# Patient Record
Sex: MALE | Race: WHITE | NOT HISPANIC OR LATINO
[De-identification: names, ages, dates, MRNs, and addresses within clinical notes are randomized per-mention and may not be internally consistent; named-entity substitution may affect disease eponyms.]

---

## 2017-01-19 ENCOUNTER — APPOINTMENT (OUTPATIENT)
Dept: INTERNAL MEDICINE | Facility: CLINIC | Age: 72
End: 2017-01-19

## 2017-01-19 VITALS
SYSTOLIC BLOOD PRESSURE: 171 MMHG | WEIGHT: 196 LBS | DIASTOLIC BLOOD PRESSURE: 80 MMHG | HEIGHT: 67 IN | HEART RATE: 76 BPM | BODY MASS INDEX: 30.76 KG/M2

## 2017-01-19 DIAGNOSIS — Z86.59 PERSONAL HISTORY OF OTHER MENTAL AND BEHAVIORAL DISORDERS: ICD-10-CM

## 2017-01-19 DIAGNOSIS — I10 ESSENTIAL (PRIMARY) HYPERTENSION: ICD-10-CM

## 2017-01-19 DIAGNOSIS — Z86.69 PERSONAL HISTORY OF OTHER DISEASES OF THE NERVOUS SYSTEM AND SENSE ORGANS: ICD-10-CM

## 2017-01-19 RX ORDER — ENALAPRIL MALEATE 20 MG/1
20 TABLET ORAL DAILY
Qty: 90 | Refills: 3 | Status: ACTIVE | COMMUNITY
Start: 2017-01-19 | End: 1900-01-01

## 2017-01-19 RX ORDER — ENALAPRIL MALEATE 10 MG/1
10 TABLET ORAL
Refills: 0 | Status: DISCONTINUED | COMMUNITY
End: 2017-01-19

## 2017-01-20 LAB
ALBUMIN SERPL-MCNC: 4.2 G/DL
ALBUMIN/GLOB SERPL: 1.83
ALP SERPL-CCNC: 51 IU/L
ALT SERPL-CCNC: 23 IU/L
ANION GAP SERPL CALC-SCNC: 8 MEQ/L
AST SERPL-CCNC: 24 IU/L
BASOPHILS # BLD: 0.04 TH/MM3
BASOPHILS NFR BLD: 0.8 %
BILIRUB SERPL-MCNC: 0.8 MG/DL
BUN SERPL-MCNC: 17 MG/DL
BUN/CREAT SERPL: 20 %
CALCIUM SERPL-MCNC: 9.7 MG/DL
CHLORIDE SERPL-SCNC: 97 MEQ/L
CO2 SERPL-SCNC: 28 MEQ/L
CREAT SERPL-MCNC: 0.85 MG/DL
EOSINOPHIL # BLD: 0.07 TH/MM3
EOSINOPHIL NFR BLD: 1.4 %
ERYTHROCYTE [DISTWIDTH] IN BLOOD BY AUTOMATED COUNT: 14.1 %
ESTIMATED AVERGAGE GLUCOSE (NORTH): 108 MG/DL
GFR SERPL CREATININE-BSD FRML MDRD: 89
GLUCOSE SERPL-MCNC: 96 MG/DL
GRANULOCYTES # BLD: 3.53 TH/MM3
GRANULOCYTES NFR BLD: 72.2 %
HBA1C MFR BLD: 5.4 %
HCT VFR BLD AUTO: 44.9 %
HGB BLD-MCNC: 14.7 G/DL
IMM GRANULOCYTES # BLD: 0.01 TH/MM3
IMM GRANULOCYTES NFR BLD: 0.2 %
LYMPHOCYTES # BLD: 0.88 TH/MM3
LYMPHOCYTES NFR BLD: 18 %
MCH RBC QN AUTO: 29 PG
MCHC RBC AUTO-ENTMCNC: 32.7 G/DL
MCV RBC AUTO: 88.6 FL
MONOCYTES # BLD: 0.36 TH/MM3
MONOCYTES NFR BLD: 7.4 %
PLATELET # BLD: 117 TH/MM3
POTASSIUM SERPL-SCNC: 4.3 MMOL/L
PROT SERPL-MCNC: 6.5 G/DL
RBC # BLD AUTO: 5.07 MIL/MM3
SODIUM SERPL-SCNC: 133 MEQ/L
WBC # BLD: 4.89 TH/MM3

## 2017-01-23 LAB
GAMMA INTERFERON BACKGROUND BLD IA-ACNC: 0.04 IU/ML
M TB IFN-G BLD-IMP: NEGATIVE
M TB IFN-G CD4+ T-CELLS BLD-ACNC: 0 IU/ML
MITOGEN IGNF BLD-ACNC: 0.6 IU/ML
TSH SERPL DL<=0.005 MIU/L-ACNC: 3.69 UIU/ML

## 2017-06-03 ENCOUNTER — INPATIENT (INPATIENT)
Facility: HOSPITAL | Age: 72
LOS: 2 days | Discharge: ANOTHER HOSPITAL | End: 2017-06-06
Attending: HOSPITALIST

## 2017-06-03 DIAGNOSIS — E87.1 HYPO-OSMOLALITY AND HYPONATREMIA: ICD-10-CM

## 2017-06-03 DIAGNOSIS — G40.909 EPILEPSY, UNSPECIFIED, NOT INTRACTABLE, WITHOUT STATUS EPILEPTICUS: ICD-10-CM

## 2017-06-28 DIAGNOSIS — E87.1 HYPO-OSMOLALITY AND HYPONATREMIA: ICD-10-CM

## 2017-06-28 DIAGNOSIS — G40.909 EPILEPSY, UNSPECIFIED, NOT INTRACTABLE, WITHOUT STATUS EPILEPTICUS: ICD-10-CM

## 2017-06-28 DIAGNOSIS — T42.1X5A ADVERSE EFFECT OF IMINOSTILBENES, INITIAL ENCOUNTER: ICD-10-CM

## 2017-06-28 DIAGNOSIS — R63.1 POLYDIPSIA: ICD-10-CM

## 2017-06-28 DIAGNOSIS — E22.2 SYNDROME OF INAPPROPRIATE SECRETION OF ANTIDIURETIC HORMONE: ICD-10-CM

## 2017-06-28 DIAGNOSIS — F79 UNSPECIFIED INTELLECTUAL DISABILITIES: ICD-10-CM

## 2017-06-28 DIAGNOSIS — F25.9 SCHIZOAFFECTIVE DISORDER, UNSPECIFIED: ICD-10-CM

## 2017-06-28 DIAGNOSIS — F39 UNSPECIFIED MOOD [AFFECTIVE] DISORDER: ICD-10-CM

## 2017-06-28 DIAGNOSIS — I10 ESSENTIAL (PRIMARY) HYPERTENSION: ICD-10-CM

## 2017-06-28 DIAGNOSIS — N39.0 URINARY TRACT INFECTION, SITE NOT SPECIFIED: ICD-10-CM

## 2017-07-05 ENCOUNTER — OUTPATIENT (OUTPATIENT)
Dept: OUTPATIENT SERVICES | Facility: HOSPITAL | Age: 72
LOS: 1 days | Discharge: HOME | End: 2017-07-05

## 2017-07-05 DIAGNOSIS — G40.909 EPILEPSY, UNSPECIFIED, NOT INTRACTABLE, WITHOUT STATUS EPILEPTICUS: ICD-10-CM

## 2017-07-05 DIAGNOSIS — E87.1 HYPO-OSMOLALITY AND HYPONATREMIA: ICD-10-CM

## 2017-07-25 ENCOUNTER — APPOINTMENT (OUTPATIENT)
Dept: NEUROLOGY | Facility: CLINIC | Age: 72
End: 2017-07-25

## 2017-07-30 ENCOUNTER — EMERGENCY (EMERGENCY)
Facility: HOSPITAL | Age: 72
LOS: 0 days | Discharge: HOME | End: 2017-07-30
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
## 2017-07-30 DIAGNOSIS — I10 ESSENTIAL (PRIMARY) HYPERTENSION: ICD-10-CM

## 2017-07-30 DIAGNOSIS — G40.909 EPILEPSY, UNSPECIFIED, NOT INTRACTABLE, WITHOUT STATUS EPILEPTICUS: ICD-10-CM

## 2017-07-30 DIAGNOSIS — E87.1 HYPO-OSMOLALITY AND HYPONATREMIA: ICD-10-CM

## 2017-07-30 DIAGNOSIS — S89.91XA UNSPECIFIED INJURY OF RIGHT LOWER LEG, INITIAL ENCOUNTER: ICD-10-CM

## 2017-07-30 DIAGNOSIS — F25.9 SCHIZOAFFECTIVE DISORDER, UNSPECIFIED: ICD-10-CM
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
## 2017-07-30 DIAGNOSIS — S80.01XA CONTUSION OF RIGHT KNEE, INITIAL ENCOUNTER: ICD-10-CM
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
## 2017-07-30 DIAGNOSIS — S80.02XA CONTUSION OF LEFT KNEE, INITIAL ENCOUNTER: ICD-10-CM

## 2017-07-30 DIAGNOSIS — R56.9 UNSPECIFIED CONVULSIONS: ICD-10-CM

## 2017-07-30 DIAGNOSIS — Z86.19 PERSONAL HISTORY OF OTHER INFECTIOUS AND PARASITIC DISEASES: ICD-10-CM

## 2018-08-06 ENCOUNTER — EMERGENCY (EMERGENCY)
Facility: HOSPITAL | Age: 73
LOS: 0 days | Discharge: HOME | End: 2018-08-06
Admitting: PHYSICIAN ASSISTANT

## 2018-08-06 VITALS
SYSTOLIC BLOOD PRESSURE: 142 MMHG | OXYGEN SATURATION: 98 % | HEART RATE: 83 BPM | RESPIRATION RATE: 18 BRPM | TEMPERATURE: 98 F | DIASTOLIC BLOOD PRESSURE: 63 MMHG

## 2018-08-06 DIAGNOSIS — M25.579 PAIN IN UNSPECIFIED ANKLE AND JOINTS OF UNSPECIFIED FOOT: ICD-10-CM

## 2018-08-06 DIAGNOSIS — M79.672 PAIN IN LEFT FOOT: ICD-10-CM

## 2018-08-06 RX ORDER — FLUOXETINE HCL 10 MG
1 CAPSULE ORAL
Qty: 0 | Refills: 0 | COMMUNITY

## 2018-08-06 RX ORDER — LEVOTHYROXINE SODIUM 125 MCG
1 TABLET ORAL
Qty: 0 | Refills: 0 | COMMUNITY

## 2018-08-06 RX ORDER — DOCUSATE SODIUM 100 MG
1 CAPSULE ORAL
Qty: 0 | Refills: 0 | COMMUNITY

## 2018-08-06 RX ORDER — LITHIUM CARBONATE 300 MG/1
150 TABLET, EXTENDED RELEASE ORAL
Qty: 0 | Refills: 0 | COMMUNITY

## 2018-08-06 RX ORDER — CHOLECALCIFEROL (VITAMIN D3) 125 MCG
1 CAPSULE ORAL
Qty: 0 | Refills: 0 | COMMUNITY

## 2018-08-06 RX ORDER — BENZTROPINE MESYLATE 1 MG
0 TABLET ORAL
Qty: 0 | Refills: 0 | COMMUNITY

## 2018-08-06 RX ORDER — CLOZAPINE 150 MG/1
0 TABLET, ORALLY DISINTEGRATING ORAL
Qty: 0 | Refills: 0 | COMMUNITY

## 2018-08-06 NOTE — ED PROVIDER NOTE - OBJECTIVE STATEMENT
72 year old male with pmhx noted, presents with left foot pain x 1 day. Pt denies injury ot trauma, swelling, or numbness.

## 2018-08-06 NOTE — ED PROVIDER NOTE - PHYSICAL EXAMINATION
CONST: Well appearing in NAD  MS: + tenderness to dorsum of left foot, pulses 2 +, Normal ROM in all extremities.   SKIN: Warm, dry, no acute rashes. Good turgor  NEURO: Strength 5/5 with no sensory deficits. Steady gait

## 2018-08-06 NOTE — CONSULT NOTE ADULT - SUBJECTIVE AND OBJECTIVE BOX
PODIATRY CONSULT   NURY MCMAHAN is a 72y old  Male who presents with a chief complaint of Left foot pain  HPI:  72y old  Male with PMH of HTN, mental Retardation, Seizure , Hypothyroid presents with left ankle pain. Pt is not able to provide sufficient HPI. Pt accompanied by aid. pt states inconsistent left ankle pain. patient is able to ambulate. Pt denies any trauma to the area. Pt denies falls or other injuries to the foot. Pt denies n/v/c/d/f/sob.       Mental retardation, idiopathic mild  Hypertension  Seizure disorder  Hypertension  Bipolar 1 disorder  Hypothyroid      PMH: Mental retardation, idiopathic mild  Hypertension  Seizure disorder  Hypertension  Bipolar 1 disorder  Hypothyroid    PSH: No significant past surgical history    Medication   Allergy: Allergy Status Unknown        Labs:                    O:   Derm: No open ulcers present. mild edema of Left foot.  Vascular: Dorsalis Pedis and Posterior Tibial pulses palpable.  Capillary re-fill time less then 3 seconds digits.  B/L feet warm to touch. venus insuficiency  Neuro: Protective sensation intact to the level of the digits bilateral.  MSK: Muscle strength 5/5 all major muscle groups left foot. No pain on ROM. no pain on palpation of left ankle ligaments. pain on palpation of the midfoot at the dorsal aspect of the talar hear.         Assessment and Plan:   Pt with left foot pain  Chart reviewed and Patient evaluated  X-rays : Shows no fractures of the left foot or ankle  Apply ace wrap left foot and ankle to provide some support  Weight bearing as tolerated.   f/u with Dr. Oconnell as o/p   will discuss care plan  with all  Attending PODIATRY CONSULT   NURY MCMAHAN is a 72y old  Male who presents with a chief complaint of Left foot pain  HPI:  72y old  Male with PMH of HTN, mental Retardation, Seizure , Hypothyroid presents with left ankle pain. Pt is not able to provide sufficient HPI. Pt accompanied by aid. pt states inconsistent left ankle pain. patient is able to ambulate. Pt denies any trauma to the area. Pt denies falls or other injuries to the foot. Pt denies n/v/c/d/f/sob.       Mental retardation, idiopathic mild  Hypertension  Seizure disorder  Hypertension  Bipolar 1 disorder  Hypothyroid      PMH: Mental retardation, idiopathic mild  Hypertension  Seizure disorder  Hypertension  Bipolar 1 disorder  Hypothyroid    PSH: No significant past surgical history    Medication   Allergy: Allergy Status Unknown        Labs:                    O:   Derm: No open ulcers present. mild edema of Left foot.  Vascular: Dorsalis Pedis and Posterior Tibial pulses palpable.  Capillary re-fill time less then 3 seconds digits.  B/L feet warm to touch. venus insuficiency  Neuro: Protective sensation intact to the level of the digits bilateral.  MSK: Muscle strength 5/5 all major muscle groups left foot. No pain on ROM. no pain on palpation of left ankle ligaments. pain on palpation of the midfoot at the dorsal aspect of the talar hear.         Assessment and Plan:   Pt with left foot pain  Chart reviewed and Patient evaluated  X-rays : Shows no fractures of the left foot or ankle  Apply ace wrap left foot and ankle to provide some support  Weight bearing as tolerated.   Recommend CAM boot left foot as o/p  Recommend MRI Left foot for further evaluation of soft tissue/muscle/tendon/ligamnt/bone injury as o/p  f/u with Dr. Oconnell as o/p   will discuss care plan  with all  Attending

## 2018-08-06 NOTE — ED PROVIDER NOTE - NS ED ROS FT
Review of Systems:  	•	CONSTITUTIONAL - no fever, no diaphoresis, no chills  	•	SKIN - no rash  	•	MUSCULOSKELETAL - + left foot pain   	•	NEUROLOGIC - no weakness,no paresthesias  	•

## 2018-08-08 PROBLEM — F70 MILD INTELLECTUAL DISABILITIES: Chronic | Status: ACTIVE | Noted: 2018-08-06

## 2018-08-08 PROBLEM — I10 ESSENTIAL (PRIMARY) HYPERTENSION: Chronic | Status: ACTIVE | Noted: 2018-08-06

## 2018-08-08 PROBLEM — G40.909 EPILEPSY, UNSPECIFIED, NOT INTRACTABLE, WITHOUT STATUS EPILEPTICUS: Chronic | Status: ACTIVE | Noted: 2018-08-06

## 2018-10-02 ENCOUNTER — TRANSCRIPTION ENCOUNTER (OUTPATIENT)
Age: 73
End: 2018-10-02

## 2018-10-26 ENCOUNTER — EMERGENCY (EMERGENCY)
Facility: HOSPITAL | Age: 73
LOS: 0 days | Discharge: HOME | End: 2018-10-26
Admitting: PHYSICIAN ASSISTANT

## 2018-10-26 VITALS
SYSTOLIC BLOOD PRESSURE: 147 MMHG | DIASTOLIC BLOOD PRESSURE: 89 MMHG | RESPIRATION RATE: 18 BRPM | OXYGEN SATURATION: 97 % | TEMPERATURE: 96 F | HEART RATE: 87 BPM

## 2018-10-26 DIAGNOSIS — I10 ESSENTIAL (PRIMARY) HYPERTENSION: ICD-10-CM

## 2018-10-26 DIAGNOSIS — F20.9 SCHIZOPHRENIA, UNSPECIFIED: ICD-10-CM

## 2018-10-26 DIAGNOSIS — Z79.52 LONG TERM (CURRENT) USE OF SYSTEMIC STEROIDS: ICD-10-CM

## 2018-10-26 DIAGNOSIS — L30.9 DERMATITIS, UNSPECIFIED: ICD-10-CM

## 2018-10-26 DIAGNOSIS — R21 RASH AND OTHER NONSPECIFIC SKIN ERUPTION: ICD-10-CM

## 2018-10-26 DIAGNOSIS — Z79.1 LONG TERM (CURRENT) USE OF NON-STEROIDAL ANTI-INFLAMMATORIES (NSAID): ICD-10-CM

## 2018-10-26 DIAGNOSIS — Z79.899 OTHER LONG TERM (CURRENT) DRUG THERAPY: ICD-10-CM

## 2018-10-26 NOTE — ED PROVIDER NOTE - PMH
Hypertension    Mental retardation, idiopathic mild    Seizure disorder Hypertension    Mental retardation, idiopathic mild    Schizophrenia    Seizure disorder

## 2018-10-26 NOTE — ED PROVIDER NOTE - OBJECTIVE STATEMENT
72 yo male from [facility] with PMH of seizure disorder, mild mental retardation, schizophrenia, HTN brought to the ED by caregiver from [facility] c/o pruritic rash throughout body x 1 month.  Patient states that the rash has become worse over the past week.  Patient has not taken anything for the rash. Patient denies fever, chills, difficulty breathing, difficulty swallowing, mouth sores, chest pain, SOB, N/V/D, recent sick contacts, sore throat, hoarseness, headache, or dizziness.

## 2018-10-26 NOTE — ED PROVIDER NOTE - NS ED ROS FT
Constitutional: (-) fever (-) malaise (-) diaphoresis (-) chills   Eyes/ENT: (-) recent sick contacts (-) sore throat (-) difficulty swallowing  Cardiovascular: (-) chest pain, (-) syncope   Respiratory: (-) dry/productive cough, (-) shortness of breath (-) difficulty breathing  Gastrointestinal: (-) N/V/D (-) abdominal pain   Musculoskeletal: (-) neck pain, (-) back pain  Integumentary: (+) rash (+) pruritus  Neurological: (-) headache, (-) dizziness

## 2018-10-26 NOTE — ED PROVIDER NOTE - NSFOLLOWUPCLINICS_GEN_ALL_ED_FT
[facility]  Podiatry  .  [place]   Phone: [phone]  Fax:   Follow Up Time:

## 2018-10-26 NOTE — ED PROVIDER NOTE - PROGRESS NOTE DETAILS
Educated patient and caregiver on patient's diagnosis and instructed to follow up with dermatology and podiatry as outpatient I supervised PA fellow care

## 2018-10-26 NOTE — ED PROVIDER NOTE - PHYSICAL EXAMINATION
GENERAL: Well-nourished, Well-developed. NAD.  ENMT: MMM. No pharyngeal erythema or exudates. No visible oral sores. Uvula midline.   CVS:  Normal S1,S2. No murmurs appreciated on auscultation   RESP: Chest rise symmetrical with good expansion. Lungs clear to auscultation B/L. No wheezing, rales, or rhonchi auscultated.  Skin: + eczematous rash to upper and lower back and under arms B/L. +scattered erythematous lesions with some overlying scabbing from scratching to arms and legs.  + few small bullae located to left palm and between fingers, but not on toes or soles of foot B/L. No ulcerous lesions.  EXT: Radial and pedal pulses present B/L. No pedal edema B/L.  Neuro: AA&O x 3. Sensation grossly intact. Strength 5/5 B/L. Gait within normal limits.   Psych: Appropriate mood and affect. Cooperative. GENERAL: Well-nourished, Well-developed. NAD.  ENMT: MMM. No pharyngeal erythema or exudates. No visible oral sores. Uvula midline.   CVS:  Normal S1,S2. No murmurs appreciated on auscultation   RESP: Chest rise symmetrical with good expansion. Lungs clear to auscultation B/L. No wheezing, rales, or rhonchi auscultated.  Skin: + eczematous rash to upper and lower back and under arms B/L. +scattered erythematous lesions with some overlying scabbing from scratching to arms, legs, and right big toe.  + few small bullae located to left palm and between fingers, but not on toes or soles of foot B/L. No ulcerous lesions. No signs of cellulitis.  EXT: Radial and pedal pulses present B/L. No pedal edema B/L.  Neuro: AA&O x 3. Sensation grossly intact. Strength 5/5 B/L. Gait within normal limits.   Psych: Appropriate mood and affect. Cooperative.

## 2018-10-26 NOTE — ED PROVIDER NOTE - CARE PROVIDER_API CALL
Ady Collins), Dermatology; Internal Medicine  [address] [place] [address]  Phone: [phone]  Fax: [phone]

## 2018-11-13 ENCOUNTER — INPATIENT (INPATIENT)
Facility: HOSPITAL | Age: 73
LOS: 7 days | Discharge: NURSING, REHAB OR LONG-TERM CARE | End: 2018-11-21
Attending: HOSPITALIST | Admitting: HOSPITALIST
Payer: MEDICARE

## 2018-11-13 VITALS
RESPIRATION RATE: 18 BRPM | OXYGEN SATURATION: 100 % | DIASTOLIC BLOOD PRESSURE: 49 MMHG | TEMPERATURE: 97 F | SYSTOLIC BLOOD PRESSURE: 109 MMHG | HEART RATE: 105 BPM

## 2018-11-13 DIAGNOSIS — R50.9 FEVER, UNSPECIFIED: ICD-10-CM

## 2018-11-13 DIAGNOSIS — T38.0X5A ADVERSE EFFECT OF GLUCOCORTICOIDS AND SYNTHETIC ANALOGUES, INITIAL ENCOUNTER: ICD-10-CM

## 2018-11-13 DIAGNOSIS — L12.0 BULLOUS PEMPHIGOID: ICD-10-CM

## 2018-11-13 DIAGNOSIS — B95.62 METHICILLIN RESISTANT STAPHYLOCOCCUS AUREUS INFECTION AS THE CAUSE OF DISEASES CLASSIFIED ELSEWHERE: ICD-10-CM

## 2018-11-13 DIAGNOSIS — I25.2 OLD MYOCARDIAL INFARCTION: ICD-10-CM

## 2018-11-13 DIAGNOSIS — R21 RASH AND OTHER NONSPECIFIC SKIN ERUPTION: ICD-10-CM
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
## 2018-11-13 DIAGNOSIS — B35.1 TINEA UNGUIUM: ICD-10-CM

## 2018-11-13 DIAGNOSIS — S00.31XA ABRASION OF NOSE, INITIAL ENCOUNTER: ICD-10-CM

## 2018-11-13 DIAGNOSIS — F70 MILD INTELLECTUAL DISABILITIES: ICD-10-CM
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
## 2018-11-13 DIAGNOSIS — F20.9 SCHIZOPHRENIA, UNSPECIFIED: ICD-10-CM
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
## 2018-11-13 DIAGNOSIS — I95.9 HYPOTENSION, UNSPECIFIED: ICD-10-CM

## 2018-11-13 DIAGNOSIS — L08.9 LOCAL INFECTION OF THE SKIN AND SUBCUTANEOUS TISSUE, UNSPECIFIED: ICD-10-CM

## 2018-11-13 DIAGNOSIS — Z28.21 IMMUNIZATION NOT CARRIED OUT BECAUSE OF PATIENT REFUSAL: ICD-10-CM

## 2018-11-13 DIAGNOSIS — I10 ESSENTIAL (PRIMARY) HYPERTENSION: ICD-10-CM

## 2018-11-13 DIAGNOSIS — R10.9 UNSPECIFIED ABDOMINAL PAIN: ICD-10-CM

## 2018-11-13 DIAGNOSIS — L03.031 CELLULITIS OF RIGHT TOE: ICD-10-CM

## 2018-11-13 DIAGNOSIS — L30.9 DERMATITIS, UNSPECIFIED: ICD-10-CM

## 2018-11-13 DIAGNOSIS — G40.909 EPILEPSY, UNSPECIFIED, NOT INTRACTABLE, WITHOUT STATUS EPILEPTICUS: ICD-10-CM

## 2018-11-13 DIAGNOSIS — D72.825 BANDEMIA: ICD-10-CM

## 2018-11-13 DIAGNOSIS — D72.1 EOSINOPHILIA: ICD-10-CM

## 2018-11-13 DIAGNOSIS — D72.828 OTHER ELEVATED WHITE BLOOD CELL COUNT: ICD-10-CM

## 2018-11-13 LAB
ALBUMIN SERPL ELPH-MCNC: 2.4 G/DL — LOW (ref 3.5–5.2)
ALP SERPL-CCNC: 56 U/L — SIGNIFICANT CHANGE UP (ref 30–115)
ALT FLD-CCNC: 16 U/L — SIGNIFICANT CHANGE UP (ref 0–41)
ANION GAP SERPL CALC-SCNC: 12 MMOL/L — SIGNIFICANT CHANGE UP (ref 7–14)
APPEARANCE UR: CLEAR — SIGNIFICANT CHANGE UP
AST SERPL-CCNC: 19 U/L — SIGNIFICANT CHANGE UP (ref 0–41)
BACTERIA # UR AUTO: ABNORMAL /HPF
BASOPHILS # BLD AUTO: 0.08 K/UL — SIGNIFICANT CHANGE UP (ref 0–0.2)
BASOPHILS NFR BLD AUTO: 0.9 % — SIGNIFICANT CHANGE UP (ref 0–1)
BILIRUB SERPL-MCNC: 0.3 MG/DL — SIGNIFICANT CHANGE UP (ref 0.2–1.2)
BILIRUB UR-MCNC: NEGATIVE — SIGNIFICANT CHANGE UP
BUN SERPL-MCNC: 33 MG/DL — HIGH (ref 10–20)
CALCIUM SERPL-MCNC: 8.8 MG/DL — SIGNIFICANT CHANGE UP (ref 8.5–10.1)
CHLORIDE SERPL-SCNC: 110 MMOL/L — SIGNIFICANT CHANGE UP (ref 98–110)
CO2 SERPL-SCNC: 26 MMOL/L — SIGNIFICANT CHANGE UP (ref 17–32)
COLOR SPEC: YELLOW — SIGNIFICANT CHANGE UP
CREAT SERPL-MCNC: 1.4 MG/DL — SIGNIFICANT CHANGE UP (ref 0.7–1.5)
DIFF PNL FLD: ABNORMAL
EOSINOPHIL # BLD AUTO: 2.3 K/UL — HIGH (ref 0–0.7)
EOSINOPHIL NFR BLD AUTO: 26.1 % — HIGH (ref 0–8)
GIANT PLATELETS BLD QL SMEAR: PRESENT — SIGNIFICANT CHANGE UP
GLUCOSE BLDC GLUCOMTR-MCNC: 89 MG/DL — SIGNIFICANT CHANGE UP (ref 70–99)
GLUCOSE SERPL-MCNC: 96 MG/DL — SIGNIFICANT CHANGE UP (ref 70–99)
GLUCOSE UR QL: NEGATIVE MG/DL — SIGNIFICANT CHANGE UP
HCT VFR BLD CALC: 33.5 % — LOW (ref 42–52)
HGB BLD-MCNC: 10.9 G/DL — LOW (ref 14–18)
KETONES UR-MCNC: ABNORMAL
LACTATE SERPL-SCNC: 2 MMOL/L — SIGNIFICANT CHANGE UP (ref 0.5–2.2)
LEUKOCYTE ESTERASE UR-ACNC: ABNORMAL
LYMPHOCYTES # BLD AUTO: 0.61 K/UL — LOW (ref 1.2–3.4)
LYMPHOCYTES # BLD AUTO: 6.9 % — LOW (ref 20.5–51.1)
MANUAL SMEAR VERIFICATION: SIGNIFICANT CHANGE UP
MCHC RBC-ENTMCNC: 28.5 PG — SIGNIFICANT CHANGE UP (ref 27–31)
MCHC RBC-ENTMCNC: 32.5 G/DL — SIGNIFICANT CHANGE UP (ref 32–37)
MCV RBC AUTO: 87.7 FL — SIGNIFICANT CHANGE UP (ref 80–94)
METAMYELOCYTES # FLD: 0.9 % — HIGH (ref 0–0)
MONOCYTES # BLD AUTO: 0.84 K/UL — HIGH (ref 0.1–0.6)
MONOCYTES NFR BLD AUTO: 9.5 % — HIGH (ref 1.7–9.3)
NEUTROPHILS # BLD AUTO: 4.67 K/UL — SIGNIFICANT CHANGE UP (ref 1.4–6.5)
NEUTROPHILS NFR BLD AUTO: 23.5 % — LOW (ref 42.2–75.2)
NEUTS BAND # BLD: 29.6 % — HIGH (ref 0–6)
NITRITE UR-MCNC: NEGATIVE — SIGNIFICANT CHANGE UP
NRBC # BLD: 0 /100 WBCS — SIGNIFICANT CHANGE UP (ref 0–0)
PH UR: 7 — SIGNIFICANT CHANGE UP (ref 5–8)
PLAT MORPH BLD: NORMAL — SIGNIFICANT CHANGE UP
PLATELET # BLD AUTO: 160 K/UL — SIGNIFICANT CHANGE UP (ref 130–400)
POIKILOCYTOSIS BLD QL AUTO: SIGNIFICANT CHANGE UP
POTASSIUM SERPL-MCNC: 4.6 MMOL/L — SIGNIFICANT CHANGE UP (ref 3.5–5)
POTASSIUM SERPL-SCNC: 4.6 MMOL/L — SIGNIFICANT CHANGE UP (ref 3.5–5)
PROMYELOCYTES # FLD: 0.9 % — HIGH (ref 0–0)
PROT SERPL-MCNC: 4.8 G/DL — LOW (ref 6–8)
PROT UR-MCNC: 30 MG/DL
RBC # BLD: 3.82 M/UL — LOW (ref 4.7–6.1)
RBC # FLD: 15.6 % — HIGH (ref 11.5–14.5)
RBC BLD AUTO: NORMAL — SIGNIFICANT CHANGE UP
SODIUM SERPL-SCNC: 148 MMOL/L — HIGH (ref 135–146)
SP GR SPEC: 1.02 — SIGNIFICANT CHANGE UP (ref 1.01–1.03)
TROPONIN T SERPL-MCNC: <0.01 NG/ML — SIGNIFICANT CHANGE UP
UROBILINOGEN FLD QL: 0.2 MG/DL — SIGNIFICANT CHANGE UP (ref 0.2–0.2)
VARIANT LYMPHS # BLD: 1.7 % — SIGNIFICANT CHANGE UP (ref 0–5)
WBC # BLD: 8.8 K/UL — SIGNIFICANT CHANGE UP (ref 4.8–10.8)
WBC # FLD AUTO: 8.8 K/UL — SIGNIFICANT CHANGE UP (ref 4.8–10.8)
WBC UR QL: ABNORMAL /HPF

## 2018-11-13 RX ORDER — SODIUM CHLORIDE 9 MG/ML
1000 INJECTION, SOLUTION INTRAVENOUS ONCE
Qty: 0 | Refills: 0 | Status: COMPLETED | OUTPATIENT
Start: 2018-11-13 | End: 2018-11-13

## 2018-11-13 RX ORDER — AMPICILLIN SODIUM AND SULBACTAM SODIUM 250; 125 MG/ML; MG/ML
INJECTION, POWDER, FOR SUSPENSION INTRAMUSCULAR; INTRAVENOUS
Qty: 0 | Refills: 0 | Status: DISCONTINUED | OUTPATIENT
Start: 2018-11-13 | End: 2018-11-14

## 2018-11-13 RX ORDER — LEVETIRACETAM 250 MG/1
500 TABLET, FILM COATED ORAL
Qty: 0 | Refills: 0 | Status: DISCONTINUED | OUTPATIENT
Start: 2018-11-13 | End: 2018-11-21

## 2018-11-13 RX ORDER — LEVETIRACETAM 250 MG/1
0 TABLET, FILM COATED ORAL
Qty: 0 | Refills: 0 | COMMUNITY

## 2018-11-13 RX ORDER — AMPICILLIN SODIUM AND SULBACTAM SODIUM 250; 125 MG/ML; MG/ML
1.5 INJECTION, POWDER, FOR SUSPENSION INTRAMUSCULAR; INTRAVENOUS ONCE
Qty: 0 | Refills: 0 | Status: COMPLETED | OUTPATIENT
Start: 2018-11-13 | End: 2018-11-13

## 2018-11-13 RX ORDER — HEPARIN SODIUM 5000 [USP'U]/ML
5000 INJECTION INTRAVENOUS; SUBCUTANEOUS EVERY 8 HOURS
Qty: 0 | Refills: 0 | Status: DISCONTINUED | OUTPATIENT
Start: 2018-11-13 | End: 2018-11-21

## 2018-11-13 RX ORDER — FLUPHENAZINE HYDROCHLORIDE 1 MG/1
0.5 TABLET, FILM COATED ORAL
Qty: 0 | Refills: 0 | COMMUNITY

## 2018-11-13 RX ORDER — AMPICILLIN SODIUM AND SULBACTAM SODIUM 250; 125 MG/ML; MG/ML
1.5 INJECTION, POWDER, FOR SUSPENSION INTRAMUSCULAR; INTRAVENOUS EVERY 6 HOURS
Qty: 0 | Refills: 0 | Status: DISCONTINUED | OUTPATIENT
Start: 2018-11-14 | End: 2018-11-14

## 2018-11-13 RX ORDER — CHLORHEXIDINE GLUCONATE 213 G/1000ML
1 SOLUTION TOPICAL
Qty: 0 | Refills: 0 | Status: DISCONTINUED | OUTPATIENT
Start: 2018-11-13 | End: 2018-11-21

## 2018-11-13 RX ORDER — OLOPATADINE HYDROCHLORIDE 1 MG/ML
0 SOLUTION/ DROPS OPHTHALMIC
Qty: 0 | Refills: 0 | COMMUNITY

## 2018-11-13 RX ORDER — DIPHENHYDRAMINE HCL 50 MG
25 CAPSULE ORAL EVERY 4 HOURS
Qty: 0 | Refills: 0 | Status: DISCONTINUED | OUTPATIENT
Start: 2018-11-13 | End: 2018-11-21

## 2018-11-13 RX ORDER — BENZTROPINE MESYLATE 1 MG
1 TABLET ORAL
Qty: 0 | Refills: 0 | COMMUNITY

## 2018-11-13 RX ORDER — BENZTROPINE MESYLATE 1 MG
0.5 TABLET ORAL
Qty: 0 | Refills: 0 | Status: DISCONTINUED | OUTPATIENT
Start: 2018-11-13 | End: 2018-11-21

## 2018-11-13 RX ORDER — TETANUS TOXOID, REDUCED DIPHTHERIA TOXOID AND ACELLULAR PERTUSSIS VACCINE, ADSORBED 5; 2.5; 8; 8; 2.5 [IU]/.5ML; [IU]/.5ML; UG/.5ML; UG/.5ML; UG/.5ML
0.5 SUSPENSION INTRAMUSCULAR ONCE
Qty: 0 | Refills: 0 | Status: COMPLETED | OUTPATIENT
Start: 2018-11-13 | End: 2018-11-13

## 2018-11-13 RX ORDER — VANCOMYCIN HCL 1 G
1000 VIAL (EA) INTRAVENOUS EVERY 12 HOURS
Qty: 0 | Refills: 0 | Status: DISCONTINUED | OUTPATIENT
Start: 2018-11-13 | End: 2018-11-14

## 2018-11-13 RX ORDER — LEVETIRACETAM 250 MG/1
1 TABLET, FILM COATED ORAL
Qty: 0 | Refills: 0 | COMMUNITY

## 2018-11-13 RX ORDER — ALPHA-1 PROTEINASE INHIBITOR HUMAN 500 MG
0 KIT INTRAVENOUS
Qty: 0 | Refills: 0 | COMMUNITY

## 2018-11-13 RX ORDER — FEXOFENADINE HCL 30 MG
0 TABLET ORAL
Qty: 0 | Refills: 0 | COMMUNITY

## 2018-11-13 RX ORDER — VANCOMYCIN HCL 1 G
1000 VIAL (EA) INTRAVENOUS ONCE
Qty: 0 | Refills: 0 | Status: COMPLETED | OUTPATIENT
Start: 2018-11-13 | End: 2018-11-13

## 2018-11-13 RX ADMIN — AMPICILLIN SODIUM AND SULBACTAM SODIUM 100 GRAM(S): 250; 125 INJECTION, POWDER, FOR SUSPENSION INTRAMUSCULAR; INTRAVENOUS at 22:30

## 2018-11-13 RX ADMIN — Medication 250 MILLIGRAM(S): at 15:53

## 2018-11-13 RX ADMIN — TETANUS TOXOID, REDUCED DIPHTHERIA TOXOID AND ACELLULAR PERTUSSIS VACCINE, ADSORBED 0.5 MILLILITER(S): 5; 2.5; 8; 8; 2.5 SUSPENSION INTRAMUSCULAR at 18:11

## 2018-11-13 RX ADMIN — SODIUM CHLORIDE 1000 MILLILITER(S): 9 INJECTION, SOLUTION INTRAVENOUS at 12:23

## 2018-11-13 RX ADMIN — HEPARIN SODIUM 5000 UNIT(S): 5000 INJECTION INTRAVENOUS; SUBCUTANEOUS at 22:11

## 2018-11-13 RX ADMIN — SODIUM CHLORIDE 2000 MILLILITER(S): 9 INJECTION, SOLUTION INTRAVENOUS at 19:26

## 2018-11-13 RX ADMIN — Medication 0.5 MILLIGRAM(S): at 22:34

## 2018-11-13 RX ADMIN — LEVETIRACETAM 500 MILLIGRAM(S): 250 TABLET, FILM COATED ORAL at 22:34

## 2018-11-13 RX ADMIN — SODIUM CHLORIDE 1000 MILLILITER(S): 9 INJECTION, SOLUTION INTRAVENOUS at 17:35

## 2018-11-13 NOTE — ED ADULT NURSE NOTE - SIGNIFICANT NEGATIVE FINDINGS
patient noted to have open sores all over body with weeping area some areas are warm to touch and red with multiple non staging areas all over body.  Area is itchy at times as per  patient

## 2018-11-13 NOTE — H&P ADULT - ATTENDING COMMENTS
Patient seen and examined independently. I agree with the resident's note, physical exam, and plan except as below.  Vital Signs Last 24 Hrs  T(C): 37.1 (14 Nov 2018 08:13), Max: 37.8 (13 Nov 2018 12:30)  T(F): 98.7 (14 Nov 2018 08:13), Max: 100.1 (13 Nov 2018 12:30)  HR: 96 (14 Nov 2018 08:13) (96 - 106)  BP: 135/63 (14 Nov 2018 08:13) (113/65 - 152/66)  BP(mean): --  RR: 18 (14 Nov 2018 08:13) (18 - 18)  SpO2: 99% (14 Nov 2018 08:13) (98% - 100%)  PE  nad  follows commands  [id number]  ctabl  soft +bs  no cce  right 1st toe erythema  diffuse patchy erythema, desquamation, exfoliation, bullae  bleeding due to excoriations  no mucosal involvement in mouth     #mechanical fall - neg trauma workup    #sepsis - hypotension, tachy, bandemia - empiric treatment with vanco and unasyn - follow up with ID recs - may consider clinda  eosinophillia increased on todays cbc  check blood cultures, podiatry eval for right 1st toe eval   possibly due to secondary bacterial infection - cellulitis - due to extreme pruritis   Burn eval for wound care - excoriations causing bleeding - unsecured mittens for relief , atarax, benadryl prn   Derm eval pending  IVFs, monitor lactate     #hx of mental retardation , scizoprenia from group home  #seizures cont keppra

## 2018-11-13 NOTE — ED PROVIDER NOTE - PHYSICAL EXAMINATION
No scalp hematoma. No c-spine tenderness. PERRL. EOMI. Superficial abrasion to right bridge of nose. No septal hematoma. Lungs equal b/l. S1 S2 regular, no murmur. ABD soft, no tenderness. Pelvis stable, no hip tenderness. Extremities with no bony tenderness, no deformity. Back: No vertebral tenderness. Skin: Chronic dermatitis. Neuro: A&Ox3, GCS 15.     A/P: 1. Weakness - Will check rectal temperature, labs, U/A, give IV fluids.  2. Fall - Will check CT head, c-spine, facial bones. Tdap.

## 2018-11-13 NOTE — ED PROVIDER NOTE - MEDICAL DECISION MAKING DETAILS
Presented for weakness and low grade fever. WBC 8 with bandemia 30%. Severe excoriation of skin. Had skin biopsies which showed contact dermatitis. CXR with no infiltrate. Hx of ORSA. Treated with vanco. Cultures sent. Can be admitted to floor.

## 2018-11-13 NOTE — ED PROVIDER NOTE - OBJECTIVE STATEMENT
74 y/o male with hx of schizophrenia, seizure d/o, chronic dermatitis. Felt weak today. No fever, no cough, no SOB. No chest pain. No ABD pain, No N/V/D. No dysuria/frequency/urgency. While walking in rain slipped on wet floor and hit face. No LOC. No vomiting. Sustained abrasion to face.  Unknown last Tdap.

## 2018-11-13 NOTE — H&P ADULT - SKIN COMMENTS
Rash all over the body sparing only palms and feet. plaques with erythematous base with scaring and some bullae and some desquamation

## 2018-11-13 NOTE — ED ADULT NURSE NOTE - NSIMPLEMENTINTERV_GEN_ALL_ED
Implemented All Fall with Harm Risk Interventions:  [place] to call system. Call bell, personal items and telephone within reach. Instruct patient to call for assistance. Room bathroom lighting operational. Non-slip footwear when patient is off stretcher. Physically safe environment: no spills, clutter or unnecessary equipment. Stretcher in lowest position, wheels locked, appropriate side rails in place. Provide visual cue, wrist band, yellow gown, etc. Monitor gait and stability. Monitor for mental status changes and reorient to person, place, and time. Review medications for side effects contributing to fall risk. Reinforce activity limits and safety measures with patient and family. Provide visual clues: red socks.

## 2018-11-13 NOTE — ED ADULT TRIAGE NOTE - CHIEF COMPLAINT QUOTE
[facility] was [facility] being seen for podiatry consult,while there pt developed orthostatic BP changes & as per staff pt has been having complaints of abd pain

## 2018-11-13 NOTE — ED PROVIDER NOTE - PROGRESS NOTE DETAILS
Labs with WBC = 8, Bands = 30%. Skin with severe excoriating dermatitis. Aids state this is chronic. Hx of ORSA. Will start vanco.

## 2018-11-13 NOTE — ED ADULT NURSE NOTE - ASSOCIATED SYMPTOMS
has abrasion to nose and and hand skin noted to have multiple open areas with non staging black areas with drainage.  Patient BP was low in the ER

## 2018-11-13 NOTE — H&P ADULT - HISTORY OF PRESENT ILLNESS
73 Yr M PMH Intellectual disability, seizure, HTN, MI, Thrombocytopenia, schizophrenia, ?? contact dermatitis presenting with mechanical fall. Pt lives in [facility] group home and is accompanied by aid. Pt provides very limited hx. Aid Reports pt slipped in ran and fell, he hit hx face and scrapped his nose. Today pt had appointment with podiatry when he was found to be hypotensive and became dizzy upon standing. Pt has a hx of skin rash that was previously seen by dermatology 4 month ago, biopsy was taken and diagnosed with contact dermatitis Since the rash has become worse. Pt reports chills and subjective fever. Denies Abd pain, nausea, vomiting, chest pain, diarrhea.  On presentation /49  Tmax 100.1. WBC 8 with 29.6% bands and sodium 148. Trauma workup neg

## 2018-11-13 NOTE — H&P ADULT - ASSESSMENT
73 Yr M PMH Intellectual disability, seizure, HTN, MI, Thrombocytopenia, schizophrenia, ?? contact dermatitis presenting with mechanical fall    #) Mechanical Fall possibly secondary to hypotension   - Admit to medicine  - Trauma work up negative  - /49  Tmax 100.1.  - Received 2L IVF in ED  - R/O infectious causes causing hypotension  - Chest Xray neg, UA neg  - F/U Blood culture  - Orthostatic vitals  - Pt/Rehab    #) Skin rash r/o Staph infection vs drug induced   - Antibiotic coverage with Vancomycin and Unasyn  - ID and Dermatology consult  - F/U ESR and CRP    #) R foot 1st toe infection  - toe is red, swollen and pus is visualized  - Cont Vancomycin  - Podiatry consult    #) Hx Schizophrenia and Seizure disorder  - Cont Benztropine, and Keppra  - F/U keppra level    #) hx HTN  - Hold meds given setting of hypotension    DVT PPX heparin Sq  Diet DASH  CHG Bath

## 2018-11-14 LAB
ANION GAP SERPL CALC-SCNC: 14 MMOL/L — SIGNIFICANT CHANGE UP (ref 7–14)
BASOPHILS # BLD AUTO: 0.03 K/UL — SIGNIFICANT CHANGE UP (ref 0–0.2)
BASOPHILS NFR BLD AUTO: 0.2 % — SIGNIFICANT CHANGE UP (ref 0–1)
BUN SERPL-MCNC: 32 MG/DL — HIGH (ref 10–20)
CALCIUM SERPL-MCNC: 8.1 MG/DL — LOW (ref 8.5–10.1)
CHLORIDE SERPL-SCNC: 110 MMOL/L — SIGNIFICANT CHANGE UP (ref 98–110)
CO2 SERPL-SCNC: 24 MMOL/L — SIGNIFICANT CHANGE UP (ref 17–32)
CREAT SERPL-MCNC: 1.2 MG/DL — SIGNIFICANT CHANGE UP (ref 0.7–1.5)
CULTURE RESULTS: SIGNIFICANT CHANGE UP
EOSINOPHIL # BLD AUTO: 11.9 K/UL — HIGH (ref 0–0.7)
EOSINOPHIL NFR BLD AUTO: 70.4 % — HIGH (ref 0–8)
ERYTHROCYTE [SEDIMENTATION RATE] IN BLOOD: 14 MM/HR — HIGH (ref 0–10)
GLUCOSE BLDC GLUCOMTR-MCNC: 87 MG/DL — SIGNIFICANT CHANGE UP (ref 70–99)
GLUCOSE SERPL-MCNC: 91 MG/DL — SIGNIFICANT CHANGE UP (ref 70–99)
HCT VFR BLD CALC: 34.3 % — LOW (ref 42–52)
HGB BLD-MCNC: 11.1 G/DL — LOW (ref 14–18)
IMM GRANULOCYTES NFR BLD AUTO: 0.2 % — SIGNIFICANT CHANGE UP (ref 0.1–0.3)
LYMPHOCYTES # BLD AUTO: 1.85 K/UL — SIGNIFICANT CHANGE UP (ref 1.2–3.4)
LYMPHOCYTES # BLD AUTO: 10.9 % — LOW (ref 20.5–51.1)
MAGNESIUM SERPL-MCNC: 2.1 MG/DL — SIGNIFICANT CHANGE UP (ref 1.8–2.4)
MCHC RBC-ENTMCNC: 28.8 PG — SIGNIFICANT CHANGE UP (ref 27–31)
MCHC RBC-ENTMCNC: 32.4 G/DL — SIGNIFICANT CHANGE UP (ref 32–37)
MCV RBC AUTO: 88.9 FL — SIGNIFICANT CHANGE UP (ref 80–94)
MONOCYTES # BLD AUTO: 0.53 K/UL — SIGNIFICANT CHANGE UP (ref 0.1–0.6)
MONOCYTES NFR BLD AUTO: 3.1 % — SIGNIFICANT CHANGE UP (ref 1.7–9.3)
NEUTROPHILS # BLD AUTO: 2.56 K/UL — SIGNIFICANT CHANGE UP (ref 1.4–6.5)
NEUTROPHILS NFR BLD AUTO: 15.2 % — LOW (ref 42.2–75.2)
NRBC # BLD: 0 /100 WBCS — SIGNIFICANT CHANGE UP (ref 0–0)
PLATELET # BLD AUTO: 160 K/UL — SIGNIFICANT CHANGE UP (ref 130–400)
POTASSIUM SERPL-MCNC: 4.2 MMOL/L — SIGNIFICANT CHANGE UP (ref 3.5–5)
POTASSIUM SERPL-SCNC: 4.2 MMOL/L — SIGNIFICANT CHANGE UP (ref 3.5–5)
RBC # BLD: 3.86 M/UL — LOW (ref 4.7–6.1)
RBC # FLD: 15.7 % — HIGH (ref 11.5–14.5)
SODIUM SERPL-SCNC: 148 MMOL/L — HIGH (ref 135–146)
SPECIMEN SOURCE: SIGNIFICANT CHANGE UP
WBC # BLD: 16.9 K/UL — HIGH (ref 4.8–10.8)
WBC # FLD AUTO: 16.9 K/UL — HIGH (ref 4.8–10.8)

## 2018-11-14 RX ORDER — HYDROXYZINE HCL 10 MG
50 TABLET ORAL THREE TIMES A DAY
Qty: 0 | Refills: 0 | Status: DISCONTINUED | OUTPATIENT
Start: 2018-11-14 | End: 2018-11-21

## 2018-11-14 RX ADMIN — HEPARIN SODIUM 5000 UNIT(S): 5000 INJECTION INTRAVENOUS; SUBCUTANEOUS at 15:29

## 2018-11-14 RX ADMIN — Medication 250 MILLIGRAM(S): at 05:45

## 2018-11-14 RX ADMIN — Medication 100 MILLIGRAM(S): at 17:08

## 2018-11-14 RX ADMIN — AMPICILLIN SODIUM AND SULBACTAM SODIUM 100 GRAM(S): 250; 125 INJECTION, POWDER, FOR SUSPENSION INTRAMUSCULAR; INTRAVENOUS at 05:45

## 2018-11-14 RX ADMIN — HEPARIN SODIUM 5000 UNIT(S): 5000 INJECTION INTRAVENOUS; SUBCUTANEOUS at 21:45

## 2018-11-14 RX ADMIN — Medication 0.5 MILLIGRAM(S): at 17:08

## 2018-11-14 RX ADMIN — AMPICILLIN SODIUM AND SULBACTAM SODIUM 100 GRAM(S): 250; 125 INJECTION, POWDER, FOR SUSPENSION INTRAMUSCULAR; INTRAVENOUS at 11:30

## 2018-11-14 RX ADMIN — LEVETIRACETAM 500 MILLIGRAM(S): 250 TABLET, FILM COATED ORAL at 05:26

## 2018-11-14 RX ADMIN — Medication 25 MILLIGRAM(S): at 05:26

## 2018-11-14 RX ADMIN — Medication 25 MILLIGRAM(S): at 21:45

## 2018-11-14 RX ADMIN — HEPARIN SODIUM 5000 UNIT(S): 5000 INJECTION INTRAVENOUS; SUBCUTANEOUS at 05:30

## 2018-11-14 RX ADMIN — Medication 0.5 MILLIGRAM(S): at 05:27

## 2018-11-14 RX ADMIN — LEVETIRACETAM 500 MILLIGRAM(S): 250 TABLET, FILM COATED ORAL at 17:08

## 2018-11-14 NOTE — CONSULT NOTE ADULT - ASSESSMENT
73 Yr M PMH Intellectual disability, seizure, HTN, MI, Thrombocytopenia, schizophrenia, ?? contact dermatitis presenting with mechanical fall    Impression: 73 Yr M PMH Intellectual disability, seizure, HTN, MI, Thrombocytopenia, schizophrenia, ?? contact dermatitis presenting with mechanical fall    Impression:  #Diffuse Erosions/ crusts and multiple flaccid bullae on arms/wrists, and on chest- no oral lesions noted: ddx includes contact dermatitis vs. drug reaction    Plan      Case to be discussed with ID attending 73 Yr M PMH Intellectual disability, seizure, HTN, MI, Thrombocytopenia, schizophrenia, prior diagnosis of contact dermatitis presenting from podiatry's office with right great toe infection and orthostatic hypotension, and with diffuse rash     Impression:  #Diffuse Blanching erythematous rash with scattered crusting erosions without muco-cutaneous lesions - ddx includes Contact dermatitis (most likely given prolonged symptoms) vs. Drug reaction vs. Bullous pemphigoid vs. pemphigus vulgaris (less likely given no muco-cutaneous lesions) vs. SSSS  vs. malignancy     #Right Great Toe Erythema without pus- less likely infectious    Plan  - d/c Vanc and Unasyn  - Give Doxy 100 mg BID for 7 days (to prevent superinfection)  - continue to monitor Right Toe for signs of infection (i.e. pus, worsening erythema/edema)  - FU Derm and Podiatry recs    Please follow up with ID Attending note 73 Yr M PMH Intellectual disability, seizure, HTN, MI, Thrombocytopenia, schizophrenia, prior diagnosis of contact dermatitis presenting from podiatry's office with right great toe infection and orthostatic hypotension, and with diffuse rash     Impression:  #Diffuse Blanching erythematous rash with scattered crusting erosions without muco-cutaneous lesions - ddx includes Contact dermatitis (most likely given prolonged symptoms) vs. Drug reaction vs. Bullous pemphigoid vs. pemphigus vulgaris (less likely given no muco-cutaneous lesions) vs. SSSS  vs. malignancy   #Leukocytosis with eosinophilia  #Right Great Toe Erythema without pus- less likely infectious    Plan  - d/c Vanc and Unasyn  - Give Doxy 100 mg BID for 7 days (to prevent superinfection)  - continue to monitor Right Toe for signs of infection (i.e. pus, worsening erythema/edema)  - Monitor for signs of systemic infections (i.e. fever, tachycardia, tachypnea)   - FU Derm and Podiatry recs    Please follow up with ID Attending note 73 Yr M PMH Intellectual disability, seizure, HTN, MI, Thrombocytopenia, schizophrenia, prior diagnosis of contact dermatitis presenting from podiatry's office with right great toe infection and orthostatic hypotension, and with diffuse rash     Impression:  #Diffuse Blanching erythematous rash with scattered crusting erosions without muco-cutaneous lesions - ddx includes Contact dermatitis (most likely given prolonged symptoms) vs. Drug reaction vs. Bullous pemphigoid vs. pemphigus vulgaris (less likely given no muco-cutaneous lesions) vs. SSSS  vs. malignancy   #Leukocytosis with eosinophilia secondary to an allergic reaction  #Right Great Toe Erythema without pus- not infected    Plan  - d/c Vanc and Unasyn  - Give Doxy 100 mg BID   -BCs      Please follow up with ID Attending note

## 2018-11-14 NOTE — CONSULT NOTE ADULT - SUBJECTIVE AND OBJECTIVE BOX
HPI: 73 Yr M PMH Intellectual disability, seizure, HTN, MI, Thrombocytopenia, schizophrenia, ?? contact dermatitis presenting with mechanical fall. Pt lives in [facility] group home and is accompanied by aid. Pt provides very limited hx. Aid Reports pt slipped in ran and fell, he hit hx face and scrapped his nose. Today pt had appointment with podiatry when he was found to be hypotensive and became dizzy upon standing. Pt has a hx of skin rash that was previously seen by dermatology 4 month ago, biopsy was taken and diagnosed with contact dermatitis Since the rash has become worse. Pt reports chills and subjective fever. Denies Abd pain, nausea, vomiting, chest pain, diarrhea.  On presentation /49  Tmax 100.1. WBC 8 with 29.6% bands and sodium 148. Trauma workup neg      PAST MEDICAL & SURGICAL HISTORY:  Schizophrenia  Mental retardation, idiopathic mild  Hypertension  Seizure disorder  No significant past surgical history    MEDICATIONS  (STANDING):  ampicillin/sulbactam  IVPB      ampicillin/sulbactam  IVPB 1.5 Gram(s) IV Intermittent every 6 hours  benztropine 0.5 milliGRAM(s) Oral two times a day  chlorhexidine 4% Liquid 1 Application(s) Topical <User Schedule>  heparin  Injectable 5000 Unit(s) SubCutaneous every 8 hours  levETIRAcetam 500 milliGRAM(s) Oral two times a day  vancomycin  IVPB 1000 milliGRAM(s) IV Intermittent every 12 hours    MEDICATIONS  (PRN):  diphenhydrAMINE 25 milliGRAM(s) Oral every 4 hours PRN Rash and/or Itching    FAMILY HISTORY:  Family history of cerebrovascular accident (CVA) (Sibling)    SOCIAL HISTORY:  REVIEW OF SYSTEMS:    CONSTITUTIONAL: No fever, weight loss, chills, shakes, or fatigue  EYES: No eye pain, visual disturbances, or discharge  ENMT:  No difficulty hearing, tinnitus, vertigo; No sinus or throat pain  NECK: No pain or stiffness  BREASTS: No pain, masses, or nipple discharge  RESPIRATORY: No cough, wheezing, hemoptysis, or shortness of breath  CARDIOVASCULAR: No chest pain, dyspnea, palpitations, dizziness, syncope, paroxysmal nocturnal dyspnea, orthopnea, or arm or leg swelling  GASTROINTESTINAL: No abdominal  or epigastric pain, nausea, vomiting, hematemesis, diarrhea, constipation, melena or bright red blood.  GENITOURINARY: No dysuria, nocturia, hematuria, or urinary incontinence  NEUROLOGICAL: No headaches, memory loss, slurred speech, limb weakness, loss of strength, numbness, or tremors  SKIN: No itching, burning, rashes, or lesions   LYMPH NODES: No enlarged glands  ENDOCRINE: No heat or cold intolerance, or hair loss  MUSCULOSKELETAL: No joint pain or swelling, muscle, back, or extremity pain  PSYCHIATRIC: No depression, anxiety, or difficulty sleeping  HEME/LYMPH: No easy bruising or bleeding gums  ALLERY AND IMMUNOLOGIC: positive rash.      Vital Signs Last 24 Hrs  T(C): 37.1 (2018 08:13), Max: 37.8 (2018 12:30)  T(F): 98.7 (2018 08:13), Max: 100.1 (2018 12:30)  HR: 96 (2018 08:13) (96 - 106)  BP: 135/63 (2018 08:13) (109/49 - 152/66)  BP(mean): --  RR: 18 (2018 08:13) (18 - 18)  SpO2: 99% (2018 08:13) (98% - 100%)    PHYSICAL EXAM:    GENERAL: In no apparent distress  HEAD:  Atraumatic, Normocephalic  EYES: EOMI, PERRLA, conjunctiva and sclera clear  ENMT: No tonsillar erythema, exudates, or enlargement; Moist mucous membranes, Good dentition, No lesions  NECK: Supple and normal thyroid.  No JVD or carotid bruit.  Carotid pulse is 2+ bilaterally.  HEART: Regular rate and rhythm; No murmurs, rubs, or gallops.  PULMONARY: Clear to auscultation and perfusion.  No rales, wheezing, or rhonchi bilaterally.  ABDOMEN: Soft, Nontender, Nondistended; Bowel sounds present  EXTREMITIES:  2+ Peripheral Pulses, No clubbing, cyanosis, or edema  NEUROLOGICAL: Grossly nonfocal  SKIN:       LABS:                        11.1   16.90 )-----------( 160      ( 2018 07:44 )             34.3         148<H>  |  110  |  33<H>  ----------------------------<  96  4.6   |  26  |  1.4    Ca    8.8      2018 11:58    TPro  4.8<L>  /  Alb  2.4<L>  /  TBili  0.3  /  DBili  x   /  AST  19  /  ALT  16  /  AlkPhos  56  11-13    CARDIAC MARKERS ( 2018 11:58 )  x     / <0.01 ng/mL / x     / x     / x          Urinalysis Basic - ( 2018 16:50 )    Color: Yellow / Appearance: Clear / S.025 / pH: x  Gluc: x / Ketone: Trace  / Bili: Negative / Urobili: 0.2 mg/dL   Blood: x / Protein: 30 mg/dL / Nitrite: Negative   Leuk Esterase: Small / RBC: x / WBC 26-50 /HPF   Sq Epi: x / Non Sq Epi: x / Bacteria: Few /HPF HPI: 72 yo M PMHx Intellectual disability, seizure, HTN, MI, Thrombocytopenia, schizophrenia, contact dermatitis(?) presenting with mechanical fall. Pt lives in [facility] group home and is accompanied by aid. Pt provides very limited hx. Aid Reports pt slipped in ran and fell, he hit hx face and scrapped his nose. Today pt had appointment with podiatry when he was found to be hypotensive and became dizzy upon standing. Pt has a hx of skin rash that was previously seen by dermatology 4 month ago, biopsy was taken and diagnosed with contact dermatitis Since the rash has become worse. Pt reports chills and subjective fever. Denies Abd pain, nausea, vomiting, chest pain, diarrhea.  On presentation /49  Tmax 100.1. WBC 8 with 29.6% bands and sodium 148. Trauma workup neg      PAST MEDICAL & SURGICAL HISTORY:  Schizophrenia  Mental retardation, idiopathic mild  Hypertension  Seizure disorder  No significant past surgical history    MEDICATIONS  (STANDING):  ampicillin/sulbactam  IVPB      ampicillin/sulbactam  IVPB 1.5 Gram(s) IV Intermittent every 6 hours  benztropine 0.5 milliGRAM(s) Oral two times a day  chlorhexidine 4% Liquid 1 Application(s) Topical <User Schedule>  heparin  Injectable 5000 Unit(s) SubCutaneous every 8 hours  levETIRAcetam 500 milliGRAM(s) Oral two times a day  vancomycin  IVPB 1000 milliGRAM(s) IV Intermittent every 12 hours    MEDICATIONS  (PRN):  diphenhydrAMINE 25 milliGRAM(s) Oral every 4 hours PRN Rash and/or Itching    FAMILY HISTORY:  Family history of cerebrovascular accident (CVA) (Sibling)    SOCIAL HISTORY:  REVIEW OF SYSTEMS:    CONSTITUTIONAL: positive fever, weight loss, chills, shakes, or fatigue  EYES: No eye pain, visual disturbances, or discharge  ENMT:  No difficulty hearing, tinnitus, vertigo; No sinus or throat pain  NECK: No pain or stiffness  BREASTS: No pain, masses, or nipple discharge  RESPIRATORY: No cough, wheezing, hemoptysis, or shortness of breath  CARDIOVASCULAR: No chest pain, dyspnea, palpitations, dizziness, syncope, paroxysmal nocturnal dyspnea, orthopnea, or arm or leg swelling  GASTROINTESTINAL: No abdominal  or epigastric pain, nausea, vomiting, hematemesis, diarrhea, constipation, melena or bright red blood.  GENITOURINARY: No dysuria, nocturia, hematuria, or urinary incontinence  NEUROLOGICAL: No headaches, memory loss, slurred speech, limb weakness, loss of strength, numbness, or tremors  SKIN: positive rash  LYMPH NODES: No enlarged glands  ENDOCRINE: No heat or cold intolerance, or hair loss  MUSCULOSKELETAL: No joint pain or swelling, muscle, back, or extremity pain  PSYCHIATRIC: No depression, anxiety, or difficulty sleeping  HEME/LYMPH: No easy bruising or bleeding gums  ALLERY AND IMMUNOLOGIC: positive rash.      Vital Signs Last 24 Hrs  T(C): 37.1 (2018 08:13), Max: 37.8 (2018 12:30)  T(F): 98.7 (2018 08:13), Max: 100.1 (2018 12:30)  HR: 96 (2018 08:) (96 - 106)  BP: 135/63 (2018 08:13) (109/49 - 152/66)  BP(mean): --  RR: 18 (2018 08:13) (18 - 18)  SpO2: 99% (2018 08:13) (98% - 100%)    PHYSICAL EXAM:    GENERAL: In no apparent distress  HEAD:  Atraumatic, Normocephalic  EYES: EOMI, PERRLA, conjunctiva and sclera clear  ENMT: No tonsillar erythema, exudates, or enlargement  NECK: Supple and normal thyroid.  No JVD or carotid bruit.    HEART: Regular rate and rhythm; No murmurs, rubs, or gallops.  PULMONARY: Clear to auscultation and perfusion.  No rales, wheezing, or rhonchi bilaterally.  ABDOMEN: Soft, Nontender, Nondistended; Bowel sounds present  EXTREMITIES:  2+ Peripheral Pulses, No clubbing, cyanosis, or edema, right great hallux discoloration, and erythema   NEUROLOGICAL: Grossly nonfocal  SKIN: Diffuse erythematous, macular coalescing rash with involvement of face, trunk, back, buttocks, groin, bilateral lower and upper extremities sparing palms and soles. no mucosal involvement desquamation of previous blistered sites with active blisters, blisters noted around right wrist, non tense       LABS:                        11.1   16.90 )-----------( 160      ( 2018 07:44 )             34.3     11-13    148<H>  |  110  |  33<H>  ----------------------------<  96  4.6   |  26  |  1.4    Ca    8.8      2018 11:58    TPro  4.8<L>  /  Alb  2.4<L>  /  TBili  0.3  /  DBili  x   /  AST  19  /  ALT  16  /  AlkPhos  56  11-13    CARDIAC MARKERS ( 2018 11:58 )  x     / <0.01 ng/mL / x     / x     / x          Urinalysis Basic - ( 2018 16:50 )    Color: Yellow / Appearance: Clear / S.025 / pH: x  Gluc: x / Ketone: Trace  / Bili: Negative / Urobili: 0.2 mg/dL   Blood: x / Protein: 30 mg/dL / Nitrite: Negative   Leuk Esterase: Small / RBC: x / WBC 26-50 /HPF   Sq Epi: x / Non Sq Epi: x / Bacteria: Few /HPF HPI: 72 yo M PMHx Intellectual disability, seizure, HTN, MI, Thrombocytopenia, schizophrenia, contact dermatitis(?) presenting with mechanical fall. Pt lives in [facility] group home and is accompanied by aid. Pt provides very limited hx. Aid Reports pt slipped in ran and fell, he hit hx face and scrapped his nose. Today pt had appointment with podiatry when he was found to be hypotensive and became dizzy upon standing. Pt has a hx of skin rash that was previously seen by dermatology 4 month ago, biopsy was taken and diagnosed with contact dermatitis Since the rash has become worse. Pt reports chills and subjective fever. Denies Abd pain, nausea, vomiting, chest pain, diarrhea.  On presentation /49  Tmax 100.1. WBC 8 with 29.6% bands and sodium 148. Trauma workup neg      PAST MEDICAL & SURGICAL HISTORY:  Schizophrenia  Mental retardation, idiopathic mild  Hypertension  Seizure disorder  No significant past surgical history    MEDICATIONS  (STANDING):  ampicillin/sulbactam  IVPB      ampicillin/sulbactam  IVPB 1.5 Gram(s) IV Intermittent every 6 hours  benztropine 0.5 milliGRAM(s) Oral two times a day  chlorhexidine 4% Liquid 1 Application(s) Topical <User Schedule>  heparin  Injectable 5000 Unit(s) SubCutaneous every 8 hours  levETIRAcetam 500 milliGRAM(s) Oral two times a day  vancomycin  IVPB 1000 milliGRAM(s) IV Intermittent every 12 hours    MEDICATIONS  (PRN):  diphenhydrAMINE 25 milliGRAM(s) Oral every 4 hours PRN Rash and/or Itching    FAMILY HISTORY:  Family history of cerebrovascular accident (CVA) (Sibling)    SOCIAL HISTORY:  REVIEW OF SYSTEMS:    CONSTITUTIONAL: positive fever, weight loss, chills, shakes, or fatigue  EYES: No eye pain, visual disturbances, or discharge  ENMT:  No difficulty hearing, tinnitus, vertigo; No sinus or throat pain  NECK: No pain or stiffness  BREASTS: No pain, masses, or nipple discharge  RESPIRATORY: No cough, wheezing, hemoptysis, or shortness of breath  CARDIOVASCULAR: No chest pain, dyspnea, palpitations, dizziness, syncope, paroxysmal nocturnal dyspnea, orthopnea, or arm or leg swelling  GASTROINTESTINAL: No abdominal  or epigastric pain, nausea, vomiting, hematemesis, diarrhea, constipation, melena or bright red blood.  GENITOURINARY: No dysuria, nocturia, hematuria, or urinary incontinence  NEUROLOGICAL: No headaches, memory loss, slurred speech, limb weakness, loss of strength, numbness, or tremors  SKIN: positive rash  LYMPH NODES: No enlarged glands  ENDOCRINE: No heat or cold intolerance, or hair loss  MUSCULOSKELETAL: No joint pain or swelling, muscle, back, or extremity pain  PSYCHIATRIC: No depression, anxiety, or difficulty sleeping  HEME/LYMPH: No easy bruising or bleeding gums  ALLERY AND IMMUNOLOGIC: positive rash.      Vital Signs Last 24 Hrs  T(C): 37.1 (2018 08:13), Max: 37.8 (2018 12:30)  T(F): 98.7 (2018 08:13), Max: 100.1 (2018 12:30)  HR: 96 (2018 08:) (96 - 106)  BP: 135/63 (2018 08:13) (109/49 - 152/66)  BP(mean): --  RR: 18 (2018 08:13) (18 - 18)  SpO2: 99% (2018 08:13) (98% - 100%)    PHYSICAL EXAM:    GENERAL: In no apparent distress  HEAD:  Atraumatic, Normocephalic  EYES: EOMI, PERRLA, conjunctiva and sclera clear  ENMT: No tonsillar erythema, exudates, or enlargement  NECK: Supple and normal thyroid.  No JVD or carotid bruit.    HEART: Regular rate and rhythm; No murmurs, rubs, or gallops.  PULMONARY: Clear to auscultation and perfusion.  No rales, wheezing, or rhonchi bilaterally.  ABDOMEN: Soft, Nontender, Nondistended; Bowel sounds present  EXTREMITIES:  2+ Peripheral Pulses, No clubbing, cyanosis, or edema, right great hallux discoloration, and erythema   NEUROLOGICAL: Grossly nonfocal  SKIN: Diffuse erythematous, macular coalescing rash with involvement of face, trunk, back, buttocks, groin, bilateral lower and upper extremities sparing palms and soles. no mucosal involvement multiple erosions with crusting with active blisters, blisters noted around right wrist, non tense       LABS:                        11.1   16.90 )-----------( 160      ( 2018 07:44 )             34.3     11-13    148<H>  |  110  |  33<H>  ----------------------------<  96  4.6   |  26  |  1.4    Ca    8.8      2018 11:58    TPro  4.8<L>  /  Alb  2.4<L>  /  TBili  0.3  /  DBili  x   /  AST  19  /  ALT  16  /  AlkPhos  56  11-13    CARDIAC MARKERS ( 2018 11:58 )  x     / <0.01 ng/mL / x     / x     / x          Urinalysis Basic - ( 2018 16:50 )    Color: Yellow / Appearance: Clear / S.025 / pH: x  Gluc: x / Ketone: Trace  / Bili: Negative / Urobili: 0.2 mg/dL   Blood: x / Protein: 30 mg/dL / Nitrite: Negative   Leuk Esterase: Small / RBC: x / WBC 26-50 /HPF   Sq Epi: x / Non Sq Epi: x / Bacteria: Few /HPF

## 2018-11-14 NOTE — CONSULT NOTE ADULT - ASSESSMENT
72 yo M presents from adult home s/p mechanical fall, as per aide patient came to adult home approximately one month ago from a psychiatric facility (unknown), upon arrival at [facility] was noted to have a diffuse rash, patient was brought to urgent care and two different dermatologists (one at [facility]?) for evaluation of rash, patient underwent skin biopsy 4 months(?) ago and diagnosed with "contact dermatitis". However as per aide, rash has progressively worsened, associated with pruritis as patient is contacting moving around and scratching himself.     Bullous Pemphigoid vs. Epidermolysis Bullosa vs. TEN vs. SSSS vs. Sweet Syndrome 72 yo M presents from adult home s/p mechanical fall, as per aide patient came to adult home approximately one month ago from a psychiatric facility (unknown), upon arrival at [facility] was noted to have a diffuse rash, patient was brought to urgent care and two different dermatologists (one at [facility]?) for evaluation of rash, patient underwent skin biopsy 4 months(?) ago and diagnosed with "contact dermatitis". However as per aide, rash has progressively worsened, associated with pruritis as patient is contacting moving around and scratching himself. Patient had 100.1F in ED, decreased neutrophil percentage with neutrophil bands of 29%, WBC 16.9 up from 8 yesterday.    Bullous Pemphigoid vs. Epidermolysis Bullosa vs. TEN vs. SSSS vs. Sweet Syndrome 74 yo M presents from adult home s/p mechanical fall, as per aide patient came to adult home approximately one month ago from a psychiatric facility (unknown), upon arrival at skilled nursing was noted to have a diffuse rash, patient was brought to urgent care and two different dermatologists (one at [facility]?) for evaluation of rash, patient underwent skin biopsy 4 months(?) ago and diagnosed with "contact dermatitis". However as per aide, rash has progressively worsened, associated with pruritis as patient is contacting moving around and scratching himself. Patient had 100.1F in ED, decreased neutrophil percentage with neutrophil bands of 29%, WBC 16.9 up from 8 yesterday.    Rash consistent with bullous pemphigoid    Suggest skin biopsy of erythematous area and border of active blister if present, done by surgery. Biopsy should be sent for both H&E as well as direct immunofluorescence.    If no contraindication, would start prednisone at 60 mg daily and when eruption is improved, would begin taper.  Fluocinonide 0.05% cream Q12 h  Patient requires outpatient follow up by dermatology for ongoing care.

## 2018-11-14 NOTE — CONSULT NOTE ADULT - SUBJECTIVE AND OBJECTIVE BOX
NURY MCMAHAN  73y, Male  Allergy: Allergy Status Unknown      HPI:  73 Yr M PMH Intellectual disability, seizure, HTN, MI, Thrombocytopenia, schizophrenia, ?? contact dermatitis presenting with mechanical fall. Pt lives in [facility] group home and is accompanied by aid. Pt provides very limited hx. Aid Reports pt slipped in ran and fell, he hit hx face and scrapped his nose. Today pt had appointment with podiatry when he was found to be hypotensive and became dizzy upon standing. Pt has a hx of skin rash that was previously seen by dermatology 4 month ago, biopsy was taken and diagnosed with contact dermatitis Since the rash has become worse. Pt reports chills and subjective fever. Denies Abd pain, nausea, vomiting, chest pain, diarrhea.  On presentation /49  Tmax 100.1. WBC 8 with 29.6% bands and sodium 148. Trauma workup neg (2018 20:01)    FAMILY HISTORY:  Family history of cerebrovascular accident (CVA) (Sibling)    PAST MEDICAL & SURGICAL HISTORY:  Schizophrenia  Mental retardation, idiopathic mild  Hypertension  Seizure disorder  No significant past surgical history        VITALS:  T(F): 98.7, Max: 100.1 (-18 @ 12:30)  HR: 96  BP: 135/63  RR: 18Vital Signs Last 24 Hrs  T(C): 37.1 (2018 08:13), Max: 37.8 (2018 12:30)  T(F): 98.7 (2018 08:13), Max: 100.1 (2018 12:30)  HR: 96 (2018 08:13) (96 - 106)  BP: 135/63 (2018 08:13) (109/49 - 152/66)  BP(mean): --  RR: 18 (2018 08:13) (18 - 18)  SpO2: 99% (2018 08:13) (98% - 100%)    PHYSICAL EXAM:  GENERAL: NAD, mental retardation at baseline, able to respond to questions  HEENT:  Atraumatic, Normocephalic, erythematous plaque on right forehead with well demarcated border, EOMI, PERRLA, conjunctiva and sclera clear, no oral lesions  NECK: Supple, No cervical lymphadenopathy  CHEST/LUNG: Clear to auscultation bilaterally;   HEART: Regular rate and rhythm; No murmurs;   ABDOMEN: Soft, Nontender, Nondistended;   EXTREMITIES:  2+ Peripheral Pulses, Right great toe - erosion with pus on the toenail bed, Left side- onychomycosis on Left big toe   SKIN: scattered healing erosions over chest, arms, abdomen, and groin and legs on an erythematous base, spares back and palms and soles Some fluid filled vesicles on right arm and wrist  and one fluid vesicle under right nipple.     TESTS & MEASUREMENTS:                        11.1   16.90 )-----------( 160      ( 2018 07:44 )             34.3     CBC Differential:  70% eosinophiles (absolute 11.90)  15% neutrophils (absolute 2.56)  10.9% lymphocytes        148<H>  |  110  |  32<H>  ----------------------------<  91  4.2   |  24  |  1.2    Ca    8.1<L>      2018 07:44  Mg     2.1         TPro  4.8<L>  /  Alb  2.4<L>  /  TBili  0.3  /  DBili  x   /  AST  19  /  ALT  16  /  AlkPhos  56      LIVER FUNCTIONS - ( 2018 11:58 )  Alb: 2.4 g/dL / Pro: 4.8 g/dL / ALK PHOS: 56 U/L / ALT: 16 U/L / AST: 19 U/L / GGT: x             Urinalysis Basic - ( 2018 16:50 )    Color: Yellow / Appearance: Clear / S.025 / pH: x  Gluc: x / Ketone: Trace  / Bili: Negative / Urobili: 0.2 mg/dL   Blood: x / Protein: 30 mg/dL / Nitrite: Negative   Leuk Esterase: Small / RBC: x / WBC 26-50 /HPF   Sq Epi: x / Non Sq Epi: x / Bacteria: Few /HPF        RADIOLOGY & ADDITIONAL TESTS:    CXRAY 18  Impression:      No radiographic evidence of acute cardiopulmonary disease.      < from: Xray Foot AP + Lateral + Oblique, Left (18 @ 10:59) >  Impression:  No evidence of acute fracture.  2.3 cm ovoid soft tissue lesion anterior to the ankle joint. This can be   further evaluated with nonemergent ultrasound or MRI.    < end of copied text >      ANTIBIOTICS:  ampicillin/sulbactam  IVPB      ampicillin/sulbactam  IVPB 1.5 Gram(s) IV Intermittent every 6 hours  vancomycin  IVPB 1000 milliGRAM(s) IV Intermittent every 12 hours NURY MCMAHAN  73y, Male  Allergy: Allergy Status Unknown      HPI:  73 Yr M PMH Intellectual disability, seizure, HTN, MI, Thrombocytopenia, schizophrenia, ?? contact dermatitis presenting with mechanical fall. Pt lives in [facility] group home and is accompanied by aid. Pt provides very limited hx. Aid Reports pt slipped in ran and fell, he hit hx face and scrapped his nose. Today pt had appointment with podiatry when he was found to be hypotensive and became dizzy upon standing. Pt has a hx of skin rash that was previously seen by dermatology 4 month ago, biopsy was taken and diagnosed with contact dermatitis Since the rash has become worse. Pt reports chills and subjective fever. Denies Abd pain, nausea, vomiting, chest pain, diarrhea.  On presentation /49  Tmax 100.1. WBC 8 with 29.6% bands and sodium 148. Trauma workup neg (2018 20:01)    FAMILY HISTORY:  Family history of cerebrovascular accident (CVA) (Sibling)    PAST MEDICAL & SURGICAL HISTORY:  Schizophrenia  Mental retardation, idiopathic mild  Hypertension  Seizure disorder  No significant past surgical history        VITALS:  T(F): 98.7, Max: 100.1 (-18 @ 12:30)  HR: 96  BP: 135/63  RR: 18Vital Signs Last 24 Hrs  T(C): 37.1 (2018 08:13), Max: 37.8 (2018 12:30)  T(F): 98.7 (2018 08:13), Max: 100.1 (2018 12:30)  HR: 96 (2018 08:13) (96 - 106)  BP: 135/63 (2018 08:13) (109/49 - 152/66)  BP(mean): --  RR: 18 (2018 08:13) (18 - 18)  SpO2: 99% (2018 08:13) (98% - 100%)    PHYSICAL EXAM:  GENERAL: NAD, mental retardation at baseline, able to respond to questions  HEENT:  Atraumatic, Normocephalic, erythematous plaque on right forehead with well demarcated border, EOMI, PERRLA, conjunctiva and sclera clear, no oral lesions  NECK: Supple, No cervical lymphadenopathy  CHEST/LUNG: Clear to auscultation bilaterally;   HEART: Regular rate and rhythm; No murmurs;   ABDOMEN: Soft, Nontender, Nondistended;   EXTREMITIES:  2+ Peripheral Pulses, Right great toe - erosion with pus on the toenail bed, Left side- onychomycosis on Left big toe   SKIN: diffuse blanching erythematous rash over abdomen, chest, arms, and legs with multiple scattered healing erosions, spares back and palms and soles. Some fluid filled bullae on right arm and right and one bulla under right nipple.     TESTS & MEASUREMENTS:                        11.1   16.90 )-----------( 160      ( 2018 07:44 )             34.3     CBC Differential:  70% eosinophiles (absolute 11.90)  15% neutrophils (absolute 2.56)  10.9% lymphocytes        148<H>  |  110  |  32<H>  ----------------------------<  91  4.2   |  24  |  1.2    Ca    8.1<L>      2018 07:44  Mg     2.1         TPro  4.8<L>  /  Alb  2.4<L>  /  TBili  0.3  /  DBili  x   /  AST  19  /  ALT  16  /  AlkPhos  56      LIVER FUNCTIONS - ( 2018 11:58 )  Alb: 2.4 g/dL / Pro: 4.8 g/dL / ALK PHOS: 56 U/L / ALT: 16 U/L / AST: 19 U/L / GGT: x             Urinalysis Basic - ( 2018 16:50 )    Color: Yellow / Appearance: Clear / S.025 / pH: x  Gluc: x / Ketone: Trace  / Bili: Negative / Urobili: 0.2 mg/dL   Blood: x / Protein: 30 mg/dL / Nitrite: Negative   Leuk Esterase: Small / RBC: x / WBC 26-50 /HPF   Sq Epi: x / Non Sq Epi: x / Bacteria: Few /HPF        RADIOLOGY & ADDITIONAL TESTS:    CXRAY 18  Impression:      No radiographic evidence of acute cardiopulmonary disease.      < from: Xray Foot AP + Lateral + Oblique, Left (18 @ 10:59) >  Impression:  No evidence of acute fracture.  2.3 cm ovoid soft tissue lesion anterior to the ankle joint. This can be   further evaluated with nonemergent ultrasound or MRI.    < end of copied text >      ANTIBIOTICS:  ampicillin/sulbactam  IVPB      ampicillin/sulbactam  IVPB 1.5 Gram(s) IV Intermittent every 6 hours  vancomycin  IVPB 1000 milliGRAM(s) IV Intermittent every 12 hours

## 2018-11-15 ENCOUNTER — LABORATORY RESULT (OUTPATIENT)
Age: 73
End: 2018-11-15

## 2018-11-15 LAB
CRP SERPL-MCNC: 12.21 MG/DL — HIGH (ref 0–0.4)
HCT VFR BLD CALC: 35.9 % — LOW (ref 42–52)
HGB BLD-MCNC: 11.7 G/DL — LOW (ref 14–18)
MCHC RBC-ENTMCNC: 28.8 PG — SIGNIFICANT CHANGE UP (ref 27–31)
MCHC RBC-ENTMCNC: 32.6 G/DL — SIGNIFICANT CHANGE UP (ref 32–37)
MCV RBC AUTO: 88.4 FL — SIGNIFICANT CHANGE UP (ref 80–94)
NRBC # BLD: 0 /100 WBCS — SIGNIFICANT CHANGE UP (ref 0–0)
PLATELET # BLD AUTO: 186 K/UL — SIGNIFICANT CHANGE UP (ref 130–400)
RBC # BLD: 4.06 M/UL — LOW (ref 4.7–6.1)
RBC # FLD: 15.7 % — HIGH (ref 11.5–14.5)
WBC # BLD: 11.15 K/UL — HIGH (ref 4.8–10.8)
WBC # FLD AUTO: 11.15 K/UL — HIGH (ref 4.8–10.8)

## 2018-11-15 RX ORDER — FLUOCINONIDE/EMOLLIENT BASE 0.05 %
1 CREAM (GRAM) TOPICAL EVERY 12 HOURS
Qty: 0 | Refills: 0 | Status: DISCONTINUED | OUTPATIENT
Start: 2018-11-15 | End: 2018-11-21

## 2018-11-15 RX ADMIN — Medication 100 MILLIGRAM(S): at 17:44

## 2018-11-15 RX ADMIN — Medication 0.5 MILLIGRAM(S): at 05:57

## 2018-11-15 RX ADMIN — LEVETIRACETAM 500 MILLIGRAM(S): 250 TABLET, FILM COATED ORAL at 05:57

## 2018-11-15 RX ADMIN — Medication 100 MILLIGRAM(S): at 05:57

## 2018-11-15 RX ADMIN — Medication 1 APPLICATION(S): at 21:27

## 2018-11-15 RX ADMIN — HEPARIN SODIUM 5000 UNIT(S): 5000 INJECTION INTRAVENOUS; SUBCUTANEOUS at 05:57

## 2018-11-15 RX ADMIN — LEVETIRACETAM 500 MILLIGRAM(S): 250 TABLET, FILM COATED ORAL at 17:44

## 2018-11-15 RX ADMIN — HEPARIN SODIUM 5000 UNIT(S): 5000 INJECTION INTRAVENOUS; SUBCUTANEOUS at 21:27

## 2018-11-15 RX ADMIN — Medication 60 MILLIGRAM(S): at 13:56

## 2018-11-15 RX ADMIN — Medication 50 MILLIGRAM(S): at 08:23

## 2018-11-15 RX ADMIN — HEPARIN SODIUM 5000 UNIT(S): 5000 INJECTION INTRAVENOUS; SUBCUTANEOUS at 13:56

## 2018-11-15 RX ADMIN — Medication 25 MILLIGRAM(S): at 13:56

## 2018-11-15 RX ADMIN — Medication 0.5 MILLIGRAM(S): at 17:44

## 2018-11-15 NOTE — CONSULT NOTE ADULT - SUBJECTIVE AND OBJECTIVE BOX
73y  Male  HPI:  73 Yr M PMH Intellectual disability, seizure, HTN, MI, Thrombocytopenia, schizophrenia, ?? contact dermatitis presenting with mechanical fall. Pt lives in [facility] group home and is accompanied by aid. Pt provides very limited hx. Aid Reports pt slipped in ran and fell, he hit hx face and scrapped his nose. Today pt had appointment with podiatry when he was found to be hypotensive and became dizzy upon standing. Pt has a hx of skin rash that was previously seen by dermatology 4 month ago, biopsy was taken and diagnosed with contact dermatitis Since the rash has become worse. Pt reports chills and subjective fever. Denies Abd pain, nausea, vomiting, chest pain, diarrhea.  On presentation /49  Tmax 100.1. WBC 8 with 29.6% bands and sodium 148. Trauma workup neg (13 Nov 2018 20:01)    Allergies    Allergy Status Unknown    PAST MEDICAL & SURGICAL HISTORY:  Schizophrenia  Mental retardation, idiopathic mild  Hypertension  Seizure disorder  No significant past surgical history    On exam: diffuse erythematous, macular rash with involvement of face, chest, abdom, back, buttocks, bilateral lower and upper extremities; no mucosal involvement; multiple erosions with crusting and active blisters.     As/ Rec: Pt is 72 y/o Male with Intellectual disability, seizure, HTN, MI, Thrombocytopenia, schizophrenia, ?contact dermatitis, presented to ED s/p fall.  As per dermatology rash consistent with ?bullous pemphigoid. Pt started on Prednisone PO and Fluocinonide 0.05% cream .  BURN consult requested for skin biopsy. 73y  Male  HPI:  73 Yr M PMH Intellectual disability, seizure, HTN, MI, Thrombocytopenia, schizophrenia, ?? contact dermatitis presenting with mechanical fall. Pt lives in [facility] group home and is accompanied by aid. Pt provides very limited hx. Aid Reports pt slipped in ran and fell, he hit hx face and scrapped his nose. Today pt had appointment with podiatry when he was found to be hypotensive and became dizzy upon standing. Pt has a hx of skin rash that was previously seen by dermatology 4 month ago, biopsy was taken and diagnosed with contact dermatitis Since the rash has become worse. Pt reports chills and subjective fever. Denies Abd pain, nausea, vomiting, chest pain, diarrhea.  On presentation /49  Tmax 100.1. WBC 8 with 29.6% bands and sodium 148. Trauma workup neg (13 Nov 2018 20:01)    Allergies    Allergy Status Unknown    PAST MEDICAL & SURGICAL HISTORY:  Schizophrenia  Mental retardation, idiopathic mild  Hypertension  Seizure disorder  No significant past surgical history    On exam: diffuse erythematous, macular rash with involvement of face, chest, abdom, back, buttocks, bilateral lower and upper extremities; no mucosal involvement; multiple erosions with crusting and active blisters.     As/ Rec: Pt is 72 y/o Male with Intellectual disability, seizure, HTN, MI, Thrombocytopenia, schizophrenia, ?contact dermatitis, presented to ED s/p fall.  As per dermatology rash consistent with ?bullous pemphigoid. Pt started on Prednisone PO and Fluocinonide 0.05% cream .  BURN consult requested for skin biopsy.     * skin biopsy done 11/15 (from Right arm)

## 2018-11-15 NOTE — PROGRESS NOTE ADULT - ASSESSMENT
73 Yr M PMH Intellectual disability, seizure, HTN, MI, Thrombocytopenia, schizophrenia, ?? contact dermatitis presenting with mechanical fall    #mechanical fall - neg trauma workup    #sepsis improving - follow up cbc from today  id consult appreciated - chnaged to doxy  check blood cultures,   possibly due to secondary bacterial infection - cellulitis - due to extreme pruritis   Burn eval for wound care and repeat biopsy of bullous lesions rec by Derm -   atarax, benadryl prn   Derm eval ddx likely  bullous pemphigoid a      #hx of mental retardation , schizophrenia from group home  #seizures cont keppra .    dc planning 24-48 hours

## 2018-11-15 NOTE — PROCEDURE NOTE - PROCEDURE
<<-----Click on this checkbox to enter Procedure Skin biopsy  11/15/2018  right wrist  Active  [id number]

## 2018-11-15 NOTE — PROGRESS NOTE ADULT - ASSESSMENT
73 Yr M PMH Intellectual disability, seizure, HTN, MI, Thrombocytopenia, schizophrenia, ?? contact dermatitis presenting with mechanical fall      # Skin rash r/o Staph infection vs drug induced   - Antibiotic coverage with Vancomycin and Unasyn  - ID and Dermatology following  - on prednisone 60mg daily as per derm    # Mechanical Fall possibly secondary to hypotension   - Chest Xray neg, UA neg  - F/U Blood culture  - Orthostatic vitals  - Pt/Rehab    # R foot 1st toe infection  - toe is red, swollen and pus is visualized  - dced vanco as per ID, started doxy 100mg daily  - Podiatry consulted    # Hx Schizophrenia and Seizure disorder  - Cont Benztropine, and Keppra  - F/U keppra level    # hx HTN  - stable    Diet: DASH  Activity: Ambulate as tolerated  DVT PPX heparin Sq  GI ppx: not indicated  Dispo: from Adult home with aid  Full Code

## 2018-11-15 NOTE — PROGRESS NOTE ADULT - SUBJECTIVE AND OBJECTIVE BOX
SUBJECTIVE:    Patient is a 73y old Male who presents with a chief complaint of s/p mechanical fall (15 Nov 2018 12:23)    Currently admitted to medicine with the primary diagnosis of Fever     Today is hospital day 2d. This morning he is resting comfortably in bed and reports no new issues or overnight events.     PAST MEDICAL & SURGICAL HISTORY  Schizophrenia  Mental retardation, idiopathic mild  Hypertension  Seizure disorder  No significant past surgical history    SOCIAL HISTORY:  Negative for smoking/alcohol/drug use.     ALLERGIES:  Allergy Status Unknown    MEDICATIONS:  STANDING MEDICATIONS  benztropine 0.5 milliGRAM(s) Oral two times a day  chlorhexidine 4% Liquid 1 Application(s) Topical <User Schedule>  doxycycline hyclate Capsule 100 milliGRAM(s) Oral every 12 hours  fluocinonide 0.05% Cream 1 Application(s) Topical every 12 hours  heparin  Injectable 5000 Unit(s) SubCutaneous every 8 hours  levETIRAcetam 500 milliGRAM(s) Oral two times a day  predniSONE   Tablet 60 milliGRAM(s) Oral daily    PRN MEDICATIONS  diphenhydrAMINE 25 milliGRAM(s) Oral every 4 hours PRN  hydrOXYzine hydrochloride 50 milliGRAM(s) Oral three times a day PRN    VITALS:   T(F): 97.8  HR: 102  BP: 106/58  RR: 20  SpO2: 100%    LABS:                        11.1   16.90 )-----------( 160      ( 2018 07:44 )             34.3     11-14    148<H>  |  110  |  32<H>  ----------------------------<  91  4.2   |  24  |  1.2    Ca    8.1<L>      2018 07:44  Mg     2.1     11-14        Urinalysis Basic - ( 2018 16:50 )    Color: Yellow / Appearance: Clear / S.025 / pH: x  Gluc: x / Ketone: Trace  / Bili: Negative / Urobili: 0.2 mg/dL   Blood: x / Protein: 30 mg/dL / Nitrite: Negative   Leuk Esterase: Small / RBC: x / WBC 26-50 /HPF   Sq Epi: x / Non Sq Epi: x / Bacteria: Few /HPF            Culture - Urine (collected 2018 17:20)  Source: .Urine Clean Catch (Midstream)  Final Report (2018 21:21):    Culture grew 3 or more types of organisms which indicate    collection contamination; consider recollection only if clinically    indicated.    Culture - Blood (collected 2018 14:46)  Source: .Blood Blood  Preliminary Report (2018 23:01):    No growth to date.    PHYSICAL EXAM:  GEN: No acute distress  LUNGS: Clear to auscultation bilaterally   HEART: S1/S2 present. RRR.   ABD: Soft, non-tender, non-distended. Bowel sounds present

## 2018-11-16 LAB
ANION GAP SERPL CALC-SCNC: 16 MMOL/L — HIGH (ref 7–14)
BASOPHILS # BLD AUTO: 0.01 K/UL — SIGNIFICANT CHANGE UP (ref 0–0.2)
BASOPHILS NFR BLD AUTO: 0.1 % — SIGNIFICANT CHANGE UP (ref 0–1)
BUN SERPL-MCNC: 41 MG/DL — HIGH (ref 10–20)
CALCIUM SERPL-MCNC: 8.2 MG/DL — LOW (ref 8.5–10.1)
CHLORIDE SERPL-SCNC: 106 MMOL/L — SIGNIFICANT CHANGE UP (ref 98–110)
CO2 SERPL-SCNC: 23 MMOL/L — SIGNIFICANT CHANGE UP (ref 17–32)
CREAT SERPL-MCNC: 1.3 MG/DL — SIGNIFICANT CHANGE UP (ref 0.7–1.5)
EOSINOPHIL # BLD AUTO: 0.61 K/UL — SIGNIFICANT CHANGE UP (ref 0–0.7)
EOSINOPHIL NFR BLD AUTO: 8.2 % — HIGH (ref 0–8)
GLUCOSE SERPL-MCNC: 77 MG/DL — SIGNIFICANT CHANGE UP (ref 70–99)
HCT VFR BLD CALC: 33.7 % — LOW (ref 42–52)
HGB BLD-MCNC: 10.8 G/DL — LOW (ref 14–18)
IMM GRANULOCYTES NFR BLD AUTO: 0.4 % — HIGH (ref 0.1–0.3)
LEVETIRACETAM SERPL-MCNC: 20.4 MCG/ML — SIGNIFICANT CHANGE UP (ref 12–46)
LYMPHOCYTES # BLD AUTO: 1.54 K/UL — SIGNIFICANT CHANGE UP (ref 1.2–3.4)
LYMPHOCYTES # BLD AUTO: 20.6 % — SIGNIFICANT CHANGE UP (ref 20.5–51.1)
MAGNESIUM SERPL-MCNC: 2.4 MG/DL — SIGNIFICANT CHANGE UP (ref 1.8–2.4)
MCHC RBC-ENTMCNC: 28.1 PG — SIGNIFICANT CHANGE UP (ref 27–31)
MCHC RBC-ENTMCNC: 32 G/DL — SIGNIFICANT CHANGE UP (ref 32–37)
MCV RBC AUTO: 87.8 FL — SIGNIFICANT CHANGE UP (ref 80–94)
MONOCYTES # BLD AUTO: 0.54 K/UL — SIGNIFICANT CHANGE UP (ref 0.1–0.6)
MONOCYTES NFR BLD AUTO: 7.2 % — SIGNIFICANT CHANGE UP (ref 1.7–9.3)
NEUTROPHILS # BLD AUTO: 4.73 K/UL — SIGNIFICANT CHANGE UP (ref 1.4–6.5)
NEUTROPHILS NFR BLD AUTO: 63.5 % — SIGNIFICANT CHANGE UP (ref 42.2–75.2)
NRBC # BLD: 0 /100 WBCS — SIGNIFICANT CHANGE UP (ref 0–0)
PHOSPHATE SERPL-MCNC: 4.1 MG/DL — SIGNIFICANT CHANGE UP (ref 2.1–4.9)
PLATELET # BLD AUTO: 166 K/UL — SIGNIFICANT CHANGE UP (ref 130–400)
POTASSIUM SERPL-MCNC: 4.4 MMOL/L — SIGNIFICANT CHANGE UP (ref 3.5–5)
POTASSIUM SERPL-SCNC: 4.4 MMOL/L — SIGNIFICANT CHANGE UP (ref 3.5–5)
RBC # BLD: 3.84 M/UL — LOW (ref 4.7–6.1)
RBC # FLD: 15.7 % — HIGH (ref 11.5–14.5)
SODIUM SERPL-SCNC: 145 MMOL/L — SIGNIFICANT CHANGE UP (ref 135–146)
WBC # BLD: 7.46 K/UL — SIGNIFICANT CHANGE UP (ref 4.8–10.8)
WBC # FLD AUTO: 7.46 K/UL — SIGNIFICANT CHANGE UP (ref 4.8–10.8)

## 2018-11-16 RX ADMIN — Medication 100 MILLIGRAM(S): at 18:39

## 2018-11-16 RX ADMIN — Medication 60 MILLIGRAM(S): at 06:35

## 2018-11-16 RX ADMIN — HEPARIN SODIUM 5000 UNIT(S): 5000 INJECTION INTRAVENOUS; SUBCUTANEOUS at 14:31

## 2018-11-16 RX ADMIN — HEPARIN SODIUM 5000 UNIT(S): 5000 INJECTION INTRAVENOUS; SUBCUTANEOUS at 06:34

## 2018-11-16 RX ADMIN — Medication 100 MILLIGRAM(S): at 06:35

## 2018-11-16 RX ADMIN — LEVETIRACETAM 500 MILLIGRAM(S): 250 TABLET, FILM COATED ORAL at 06:35

## 2018-11-16 RX ADMIN — Medication 0.5 MILLIGRAM(S): at 18:39

## 2018-11-16 RX ADMIN — Medication 1 APPLICATION(S): at 08:43

## 2018-11-16 RX ADMIN — LEVETIRACETAM 500 MILLIGRAM(S): 250 TABLET, FILM COATED ORAL at 18:39

## 2018-11-16 RX ADMIN — HEPARIN SODIUM 5000 UNIT(S): 5000 INJECTION INTRAVENOUS; SUBCUTANEOUS at 21:30

## 2018-11-16 RX ADMIN — Medication 0.5 MILLIGRAM(S): at 06:34

## 2018-11-16 NOTE — PROGRESS NOTE ADULT - ATTENDING COMMENTS
Patient is seen and examined independently. I agree with resident note above and plan of care -edited and corrected where applicable- with addition:     PHYSICAL EXAM:  GENERAL: elderly male in NAD, well-developed  HEAD:  Normocephalic  EYES: EOMI, PERRLA  NECK: Supple, No JVD  CHEST/LUNG: Clear to auscultation bilaterally; No wheeze  HEART: Regular rate and rhythm; No murmurs, rubs, or gallops  ABDOMEN: Soft, Nontender, Nondistended; Bowel sounds present, abdominal hernia  PSYCH: pt responds to name and answers "no" repeatedly to each question  SKIN:  Diffuse erythematous, macular rash w/ crusting lesions over face, trunk, b/l upper and lower extremities except palms and soles    All labs and Imaging reviewed    74 yo M with PMHx of intellectual disability, seizure, HTN, MI, Thrombocytopenia, schizophrenia and dermatitis presented s/p mechanical fall. Thought to have possible sepsis 2/2 cellulitis. However per ID and derm, rash consistent with bullous pemphigoid. S/p biopsy by burn, results pending.     Assessment:   - Diffuse erythematous, macular rash w/ crusting lesions likely 2/2 bullous pemphigoid  - Leucocytosis w/ eosinophile likely reactive (resolved)  - Right Great Toe Erythema (without pus, not infected)  - Hx of mental retardation, Schizophrenia   - Hx of seizures  - DVT ppx    Plan:  - I discussed patients treatment plan with dermatology Dr. Weathers today. He recommends prednisone 60mg daily x 1 week and then 40mg x 1 week with close outpatient derm follow-up for further monitoring of rash and taper of prednisone. Pt will likely require lifetime treatment for bullous pemphigoid which will be determined as outpt after rash resolution   - c/w fluocinonide cream  - c/w doxy 100mg q12h to complete 2 weeks  - derm recs appreciated  - ID recs appreciated   - c/w keppra  - HSQ for DVT ppx    Pt with prior hx of dermatitis w/ no workup therefore SW on board for safe d/c planning. Plan to d/c pt w/ prednisone taper and close derm f/u once acceptable facility is determined.

## 2018-11-16 NOTE — PROGRESS NOTE ADULT - SUBJECTIVE AND OBJECTIVE BOX
SUBJECTIVE:    Patient is a 73y old Male who presents with a chief complaint of s/p mechanical fall (15 Nov 2018 13:19)    Currently admitted to medicine with the primary diagnosis of Fever     Today is hospital day 3d. This morning he is resting comfortably in bed and reports no new issues or overnight events.     PAST MEDICAL & SURGICAL HISTORY  Schizophrenia  Mental retardation, idiopathic mild  Hypertension  Seizure disorder  No significant past surgical history    SOCIAL HISTORY:  Negative for smoking/alcohol/drug use.     ALLERGIES:  Allergy Status Unknown    MEDICATIONS:  STANDING MEDICATIONS  benztropine 0.5 milliGRAM(s) Oral two times a day  chlorhexidine 4% Liquid 1 Application(s) Topical <User Schedule>  doxycycline hyclate Capsule 100 milliGRAM(s) Oral every 12 hours  fluocinonide 0.05% Cream 1 Application(s) Topical every 12 hours  heparin  Injectable 5000 Unit(s) SubCutaneous every 8 hours  levETIRAcetam 500 milliGRAM(s) Oral two times a day  predniSONE   Tablet 60 milliGRAM(s) Oral daily    PRN MEDICATIONS  diphenhydrAMINE 25 milliGRAM(s) Oral every 4 hours PRN  hydrOXYzine hydrochloride 50 milliGRAM(s) Oral three times a day PRN    VITALS:   T(F): 98  HR: 92  BP: 152/66  RR: 18  SpO2: 100%    LABS:                        10.8   7.46  )-----------( 166      ( 16 Nov 2018 06:47 )             33.7     11-16    145  |  106  |  41<H>  ----------------------------<  77  4.4   |  23  |  1.3    Ca    8.2<L>      16 Nov 2018 06:47  Phos  4.1     11-16  Mg     2.4     11-16                Culture - Blood (collected 14 Nov 2018 07:44)  Source: .Blood None  Preliminary Report (15 Nov 2018 15:01):    No growth to date.    Culture - Blood (collected 14 Nov 2018 03:14)  Source: .Blood None  Preliminary Report (15 Nov 2018 15:01):    No growth to date.    Culture - Urine (collected 13 Nov 2018 17:20)  Source: .Urine Clean Catch (Midstream)  Final Report (14 Nov 2018 21:21):    Culture grew 3 or more types of organisms which indicate    collection contamination; consider recollection only if clinically    indicated.    Culture - Blood (collected 13 Nov 2018 14:46)  Source: .Blood Blood  Preliminary Report (14 Nov 2018 23:01):    No growth to date.          RADIOLOGY:    PHYSICAL EXAM:  GEN: No acute distress  LUNGS: Clear to auscultation bilaterally   HEART: S1/S2 present. RRR.   ABD: Soft, non-tender, non-distended. Bowel sounds present  EXT: NC/NC/NE/2+PP/GONZALEZ  NEURO: AAOX3 SUBJECTIVE:    Patient is a 73y old Male who presents with a chief complaint of s/p mechanical fall (15 Nov 2018 13:19)    Currently admitted to medicine with the primary diagnosis of Fever     Today is hospital day 3d. This morning he is resting comfortably in bed and reports no new issues or overnight events. Continues to have diffuse body lesions which are slightly improving.     PAST MEDICAL & SURGICAL HISTORY  Schizophrenia  Mental retardation, idiopathic mild  Hypertension  Seizure disorder  No significant past surgical history    SOCIAL HISTORY:  Negative for smoking/alcohol/drug use.     ALLERGIES:  Allergy Status Unknown    MEDICATIONS:  STANDING MEDICATIONS  benztropine 0.5 milliGRAM(s) Oral two times a day  chlorhexidine 4% Liquid 1 Application(s) Topical <User Schedule>  doxycycline hyclate Capsule 100 milliGRAM(s) Oral every 12 hours  fluocinonide 0.05% Cream 1 Application(s) Topical every 12 hours  heparin  Injectable 5000 Unit(s) SubCutaneous every 8 hours  levETIRAcetam 500 milliGRAM(s) Oral two times a day  predniSONE   Tablet 60 milliGRAM(s) Oral daily    PRN MEDICATIONS  diphenhydrAMINE 25 milliGRAM(s) Oral every 4 hours PRN  hydrOXYzine hydrochloride 50 milliGRAM(s) Oral three times a day PRN    VITALS:   T(F): 98  HR: 92  BP: 152/66  RR: 18  SpO2: 100%    LABS:                        10.8   7.46  )-----------( 166      ( 16 Nov 2018 06:47 )             33.7     11-16    145  |  106  |  41<H>  ----------------------------<  77  4.4   |  23  |  1.3    Ca    8.2<L>      16 Nov 2018 06:47  Phos  4.1     11-16  Mg     2.4     11-16                Culture - Blood (collected 14 Nov 2018 07:44)  Source: .Blood None  Preliminary Report (15 Nov 2018 15:01):    No growth to date.    Culture - Blood (collected 14 Nov 2018 03:14)  Source: .Blood None  Preliminary Report (15 Nov 2018 15:01):    No growth to date.    Culture - Urine (collected 13 Nov 2018 17:20)  Source: .Urine Clean Catch (Midstream)  Final Report (14 Nov 2018 21:21):    Culture grew 3 or more types of organisms which indicate    collection contamination; consider recollection only if clinically    indicated.    Culture - Blood (collected 13 Nov 2018 14:46)  Source: .Blood Blood  Preliminary Report (14 Nov 2018 23:01):    No growth to date.    PHYSICAL EXAM:  GEN: No acute distress  LUNGS: Clear to auscultation bilaterally   HEART: S1/S2 present. RRR.   ABD: Soft, non-tender, non-distended. Bowel sounds present  EXT: NC/NC/NE/2+PP/GONZALEZ  NEURO: AAOX3

## 2018-11-16 NOTE — PATIENT PROFILE ADULT - PRIMARY SOURCE OF SUPPORT/COMFORT
I, Jose Carlos Kyle, performed the initial face to face bedside interview with this patient regarding history of present illness, review of symptoms and relevant past medical, social and family history.  I completed an independent physical examination.  I was the initial provider who evaluated this patient. I have signed out the follow up of any pending tests (i.e. labs, radiological studies) to the ACP.  I have communicated the patient’s plan of care and disposition with the ACP.  The history, relevant review of systems, past medical and surgical history, medical decision making, and physical examination was documented by the scribe in my presence and I attest to the accuracy of the documentation. nonrelative caregiver

## 2018-11-16 NOTE — PROGRESS NOTE ADULT - ASSESSMENT
73 Yr M PMH Intellectual disability, seizure, HTN, MI, Thrombocytopenia, schizophrenia, ?? contact dermatitis presenting with mechanical fall    #mechanical fall - neg trauma workup    #sepsis improving - follow up cbc from today  - id consult appreciated - changed to doxy  - blood cultures negative to date   - Burn eval for wound care and repeat biopsy of bullous lesions rec by Derm, completed, will await results of biopsy  - pt on prednisone and topical steroid for now-   -  c/w atarax, benadryl prn     # hx of mental retardation , schizophrenia from group home  - on home meds  - follows up w psych    #seizures  - cont keppra .    Diet: Regular  Activity: as tolerated  DVT ppx: hep 5k q8  GI ppx: not indicated  Dispo: from group home  Full Code

## 2018-11-17 ENCOUNTER — TRANSCRIPTION ENCOUNTER (OUTPATIENT)
Age: 73
End: 2018-11-17

## 2018-11-17 LAB
ANION GAP SERPL CALC-SCNC: 14 MMOL/L — SIGNIFICANT CHANGE UP (ref 7–14)
BASOPHILS # BLD AUTO: 0.03 K/UL — SIGNIFICANT CHANGE UP (ref 0–0.2)
BASOPHILS NFR BLD AUTO: 0.2 % — SIGNIFICANT CHANGE UP (ref 0–1)
BUN SERPL-MCNC: 37 MG/DL — HIGH (ref 10–20)
CALCIUM SERPL-MCNC: 8.1 MG/DL — LOW (ref 8.5–10.1)
CHLORIDE SERPL-SCNC: 105 MMOL/L — SIGNIFICANT CHANGE UP (ref 98–110)
CO2 SERPL-SCNC: 23 MMOL/L — SIGNIFICANT CHANGE UP (ref 17–32)
CREAT SERPL-MCNC: 1 MG/DL — SIGNIFICANT CHANGE UP (ref 0.7–1.5)
EOSINOPHIL # BLD AUTO: 7.42 K/UL — HIGH (ref 0–0.7)
EOSINOPHIL NFR BLD AUTO: 42.9 % — HIGH (ref 0–8)
GLUCOSE SERPL-MCNC: 83 MG/DL — SIGNIFICANT CHANGE UP (ref 70–99)
HCT VFR BLD CALC: 32.8 % — LOW (ref 42–52)
HGB BLD-MCNC: 10.6 G/DL — LOW (ref 14–18)
IMM GRANULOCYTES NFR BLD AUTO: 0.5 % — HIGH (ref 0.1–0.3)
LYMPHOCYTES # BLD AUTO: 11.6 % — LOW (ref 20.5–51.1)
LYMPHOCYTES # BLD AUTO: 2.01 K/UL — SIGNIFICANT CHANGE UP (ref 1.2–3.4)
MAGNESIUM SERPL-MCNC: 2.2 MG/DL — SIGNIFICANT CHANGE UP (ref 1.8–2.4)
MCHC RBC-ENTMCNC: 28.4 PG — SIGNIFICANT CHANGE UP (ref 27–31)
MCHC RBC-ENTMCNC: 32.3 G/DL — SIGNIFICANT CHANGE UP (ref 32–37)
MCV RBC AUTO: 87.9 FL — SIGNIFICANT CHANGE UP (ref 80–94)
MONOCYTES # BLD AUTO: 0.83 K/UL — HIGH (ref 0.1–0.6)
MONOCYTES NFR BLD AUTO: 4.8 % — SIGNIFICANT CHANGE UP (ref 1.7–9.3)
NEUTROPHILS # BLD AUTO: 6.93 K/UL — HIGH (ref 1.4–6.5)
NEUTROPHILS NFR BLD AUTO: 40 % — LOW (ref 42.2–75.2)
NRBC # BLD: 0 /100 WBCS — SIGNIFICANT CHANGE UP (ref 0–0)
PHOSPHATE SERPL-MCNC: 2.2 MG/DL — SIGNIFICANT CHANGE UP (ref 2.1–4.9)
PLATELET # BLD AUTO: 179 K/UL — SIGNIFICANT CHANGE UP (ref 130–400)
POTASSIUM SERPL-MCNC: 4 MMOL/L — SIGNIFICANT CHANGE UP (ref 3.5–5)
POTASSIUM SERPL-SCNC: 4 MMOL/L — SIGNIFICANT CHANGE UP (ref 3.5–5)
RBC # BLD: 3.73 M/UL — LOW (ref 4.7–6.1)
RBC # FLD: 15.6 % — HIGH (ref 11.5–14.5)
SODIUM SERPL-SCNC: 142 MMOL/L — SIGNIFICANT CHANGE UP (ref 135–146)
WBC # BLD: 17.3 K/UL — HIGH (ref 4.8–10.8)
WBC # FLD AUTO: 17.3 K/UL — HIGH (ref 4.8–10.8)

## 2018-11-17 RX ORDER — FLUOCINONIDE/EMOLLIENT BASE 0.05 %
1 CREAM (GRAM) TOPICAL
Qty: 0 | Refills: 0 | COMMUNITY
Start: 2018-11-17

## 2018-11-17 RX ORDER — PANTOPRAZOLE SODIUM 20 MG/1
40 TABLET, DELAYED RELEASE ORAL
Qty: 0 | Refills: 0 | Status: DISCONTINUED | OUTPATIENT
Start: 2018-11-17 | End: 2018-11-21

## 2018-11-17 RX ORDER — PANTOPRAZOLE SODIUM 20 MG/1
1 TABLET, DELAYED RELEASE ORAL
Qty: 0 | Refills: 0 | COMMUNITY
Start: 2018-11-17

## 2018-11-17 RX ADMIN — Medication 100 MILLIGRAM(S): at 06:21

## 2018-11-17 RX ADMIN — PANTOPRAZOLE SODIUM 40 MILLIGRAM(S): 20 TABLET, DELAYED RELEASE ORAL at 14:39

## 2018-11-17 RX ADMIN — LEVETIRACETAM 500 MILLIGRAM(S): 250 TABLET, FILM COATED ORAL at 06:21

## 2018-11-17 RX ADMIN — Medication 0.5 MILLIGRAM(S): at 17:53

## 2018-11-17 RX ADMIN — Medication 100 MILLIGRAM(S): at 17:53

## 2018-11-17 RX ADMIN — Medication 60 MILLIGRAM(S): at 06:21

## 2018-11-17 RX ADMIN — Medication 0.5 MILLIGRAM(S): at 06:21

## 2018-11-17 RX ADMIN — Medication 1 APPLICATION(S): at 17:51

## 2018-11-17 RX ADMIN — HEPARIN SODIUM 5000 UNIT(S): 5000 INJECTION INTRAVENOUS; SUBCUTANEOUS at 22:57

## 2018-11-17 RX ADMIN — HEPARIN SODIUM 5000 UNIT(S): 5000 INJECTION INTRAVENOUS; SUBCUTANEOUS at 06:22

## 2018-11-17 RX ADMIN — Medication 1 APPLICATION(S): at 14:29

## 2018-11-17 RX ADMIN — HEPARIN SODIUM 5000 UNIT(S): 5000 INJECTION INTRAVENOUS; SUBCUTANEOUS at 14:29

## 2018-11-17 RX ADMIN — Medication 1 APPLICATION(S): at 06:22

## 2018-11-17 RX ADMIN — LEVETIRACETAM 500 MILLIGRAM(S): 250 TABLET, FILM COATED ORAL at 17:53

## 2018-11-17 NOTE — DISCHARGE NOTE ADULT - ADDITIONAL INSTRUCTIONS
1-Take the prednisone as prescribed   2-Take the doxycycline as prescribed   3-Follow up with your primary care physician and dermatologist for biopsy results and further treatment 1-Take the prednisone as prescribed   2-Take the doxycycline as prescribed   3-follow up with Burn service for biopsy results on Tuesday next week.  4-Follow up with your primary care physician and dermatologist for biopsy results and further treatment 1-Take the prednisone as prescribed   2-Take the doxycycline as prescribed   3-follow up with Burn service for biopsy results on Tuesday next week.  4-Follow up with your primary care physician and dermatologist for biopsy results and further treatment  If needed you may call [phone] to inquire if you can get biopsy results directly from pathology department or medical records department 2 weeks from biopsy date.    Please give prednisone - 3 tab(s) by mouth once a day until 11/22/2018 then 2 tablets once a day until 11/29/2018  -- Indication: For Bullous Pemphigoid  Please apply fluocinonide 0.05% topical cream  --1 application on skin every 12 hours  --Indication: For Bullous Pemphigoid    please take doxycycline monohydrate 100 mg oral capsule  -- 1 cap(s) by mouth every 12 hours until 11/29/2018  -- Indication: For Cellulitis 1-Take the prednisone as prescribed   2-Take the doxycycline as prescribed   3-follow up with Burn service for biopsy results on Tuesday next week.  4-Follow up with your primary care physician and dermatologist for biopsy results and further treatment  If needed you may call [phone] to inquire if you can get biopsy results directly from pathology department or medical records department 2 weeks from biopsy date.    Please give prednisone - 3 tab(s) by mouth once a day until 11/22/2018 then 2 tablets once a day until 11/29/2018  -- Indication: For Bullous Pemphigoid  Please apply fluocinonide 0.05% topical cream  --1 application on skin every 12 hours  --Indication: For Bullous Pemphigoid    please take doxycycline monohydrate 100 mg oral capsule  -- 1 cap(s) by mouth every 12 hours until 11/29/2018  -- Indication: For Cellulitis  please elevate leg for leg swelling of the left leg. Please follow up with your primary care doctor. 1-Take the prednisone as prescribed   2-Take the doxycycline as prescribed   3-follow up with Burn service for biopsy results on Tuesday next week.  4-Follow up with your primary care physician and dermatologist for biopsy results and further treatment  If needed you may call [phone] to inquire if you can get biopsy results directly from pathology department or medical records department 2 weeks from biopsy date.    Please give prednisone - 3 tab(s) by mouth once a day until 11/22/2018 then 2 tablets once a day until 11/29/2018  -- Indication: For Bullous Pemphigoid  Please note that the patient already received his dose of prednisone for today in the hospital (day of discharge).  Please apply fluocinonide 0.05% topical cream  --1 application on skin every 12 hours  --Indication: For Bullous Pemphigoid    please take doxycycline monohydrate 100 mg oral capsule  -- 1 cap(s) by mouth every 12 hours until 11/29/2018  -- Indication: For Cellulitis  please elevate leg for leg swelling of the left leg. Please follow up with your primary care doctor. 1-Take the prednisone as prescribed   2-Take the doxycycline as prescribed   3-follow up with Burn service for biopsy results on Tuesday next week.  4-Follow up with your primary care physician and dermatologist for biopsy results and further treatment  If needed you may call [phone] to inquire if you can get biopsy results directly from pathology department or medical records department 2 weeks from biopsy date.    Please give prednisone - 3 tab(s) by mouth once a day until 11/22/2018 then 2 tablets once a day until 11/29/2018  -- Indication: For Bullous Pemphigoid  Please note that the patient already received his dose of prednisone for today in the hospital (day of discharge).  Please apply fluocinonide 0.05% topical cream  --1 application on skin every 12 hours  --Indication: For Bullous Pemphigoid    please take doxycycline monohydrate 100 mg oral capsule  -- 1 cap(s) by mouth every 12 hours until 11/29/2018  -- Indication: For Cellulitis  please elevate leg for leg swelling of the left leg. Please follow up with your primary care doctor.  Please follow up with your Primary care doctor with regard to leg swelling and need for an outpatient baseline transthoracic echocardiogram.

## 2018-11-17 NOTE — PROGRESS NOTE ADULT - SUBJECTIVE AND OBJECTIVE BOX
NURY MCMAHAN  73y, Male      OVERNIGHT EVENTS:    no fevers    VITALS:  T(F): 97.1, Max: 97.7 (11-16-18 @ 20:03)  HR: 80  BP: 134/62  RR: 18Vital Signs Last 24 Hrs  T(C): 36.2 (17 Nov 2018 05:00), Max: 36.5 (16 Nov 2018 20:03)  T(F): 97.1 (17 Nov 2018 05:00), Max: 97.7 (16 Nov 2018 20:03)  HR: 80 (17 Nov 2018 05:00) (80 - 88)  BP: 134/62 (17 Nov 2018 05:00) (130/68 - 152/67)  BP(mean): --  RR: 18 (17 Nov 2018 05:00) (18 - 18)  SpO2: 100% (16 Nov 2018 15:55) (100% - 100%)    TESTS & MEASUREMENTS:                        10.6   17.30 )-----------( 179      ( 17 Nov 2018 07:59 )             32.8     11-17    142  |  105  |  37<H>  ----------------------------<  83  4.0   |  23  |  1.0    Ca    8.1<L>      17 Nov 2018 07:59  Phos  2.2     11-17  Mg     2.2     11-17          Culture - Blood (collected 11-14-18 @ 07:44)  Source: .Blood None  Preliminary Report (11-15-18 @ 15:01):    No growth to date.    Culture - Blood (collected 11-14-18 @ 03:14)  Source: .Blood None  Preliminary Report (11-15-18 @ 15:01):    No growth to date.    Culture - Urine (collected 11-13-18 @ 17:20)  Source: .Urine Clean Catch (Midstream)  Final Report (11-14-18 @ 21:21):    Culture grew 3 or more types of organisms which indicate    collection contamination; consider recollection only if clinically    indicated.    Culture - Blood (collected 11-13-18 @ 14:46)  Source: .Blood Blood  Preliminary Report (11-14-18 @ 23:01):    No growth to date.            RADIOLOGY & ADDITIONAL TESTS:    ANTIBIOTICS:  doxycycline hyclate Capsule 100 milliGRAM(s) Oral every 12 hours

## 2018-11-17 NOTE — DISCHARGE NOTE ADULT - CARE PLAN
Principal Discharge DX:	Dermatitis  Goal:	Resolution  Assessment and plan of treatment:	You have a dermitis likely due to bullous pemphigoid. A biopsy has been taken   Please take the prednisone as prescribed, and continue with the fluocinonide cream until resolution   Follow up with your primary care physician and dermatologist for results on the biopsy and further treatment recommendations  Secondary Diagnosis:	Cellulitis  Goal:	Resolution  Assessment and plan of treatment:	You also had cellulitis of the right toe treated with antibiotics with improvement    Please take the doxycycline as prescribed until 11/25/2018  Secondary Diagnosis:	Hypertension  Goal:	Control  Assessment and plan of treatment:	At admission you had low BP which has picked up after treatment.   The Enalapril was held however you can restart taking it as prescribed  Secondary Diagnosis:	Schizophrenia  Goal:	Stability  Assessment and plan of treatment:	Stable. Please take your medications as prescribed and follow up with your primary care physician  Secondary Diagnosis:	Seizure disorder  Goal:	Stability  Assessment and plan of treatment:	Stable. Please take your medications as prescribed and follow up with your primary care physician Principal Discharge DX:	Dermatitis  Goal:	Resolution  Assessment and plan of treatment:	You have a dermitis likely due to bullous pemphigoid. A biopsy has been taken   Please take the prednisone as prescribed, and continue with the fluocinonide cream until resolution   Follow up with your primary care physician and dermatologist for results on the biopsy and further treatment recommendations  Secondary Diagnosis:	Cellulitis  Goal:	Resolution  Assessment and plan of treatment:	You also had cellulitis of the right toe treated with antibiotics with improvement    Please take the doxycycline as prescribed until 11/25/2018  Secondary Diagnosis:	Hypertension  Goal:	Control  Assessment and plan of treatment:	At admission you had low BP which has picked up after treatment.   The Enalapril was held however you can restart taking it as prescribed  Secondary Diagnosis:	Schizophrenia  Goal:	Stability  Assessment and plan of treatment:	Stable. Please take your medications as prescribed and follow up with your primary care physician  Secondary Diagnosis:	Seizure disorder  Goal:	Stability  Assessment and plan of treatment:	Stable. Please take your medications as prescribed and follow up with your primary care physician  Secondary Diagnosis:	Leg swelling  Goal:	manage and prevent complications  Assessment and plan of treatment:	please elevate your legs  please follow up with your primary care doctor.

## 2018-11-17 NOTE — DISCHARGE NOTE ADULT - CARE PROVIDER_API CALL
Serge Martinez)  Phone: [phone]    Pablo Weathers (MD), Dermatology; Internal Medicine  [address] [place] [address]  Phone: [phone]  Fax: [phone] Serge Martinez)  Phone: [phone]    Pablo Weathers (MD), Dermatology; Internal Medicine  [address] [place] [id number]  Phone: [phone]  Fax: [phone]    Mario Dominguez), Plastic Surgery  [address] [place] [address]  Phone: [phone]  Fax: [phone]

## 2018-11-17 NOTE — PROGRESS NOTE ADULT - ASSESSMENT
________________________________________________________________________________  DAILY PROGRESS NOTE:    ================== SUBJECTIVE ==================    NURY MCMAHAN  /   73y  /  Male  /  MRN#: [id number]  Patient is a 73y old Male who presents with a chief complaint of s/p mechanical fall (16 Nov 2018 11:47)  Currently admitted to medicine with the primary diagnosis of Fever      HOSPITAL DAY: 4d     OVERNIGHT EVENTS: None    =================== OBJECTIVE ===================    VITAL SIGNS: Last 24 Hours  T(C): 36.2 (17 Nov 2018 05:00), Max: 36.5 (16 Nov 2018 20:03)  T(F): 97.1 (17 Nov 2018 05:00), Max: 97.7 (16 Nov 2018 20:03)  HR: 80 (17 Nov 2018 05:00) (80 - 88)  BP: 134/62 (17 Nov 2018 05:00) (130/68 - 152/67)  BP(mean): --  RR: 18 (17 Nov 2018 05:00) (18 - 18)  SpO2: 100% (16 Nov 2018 15:55) (100% - 100%)    11-16-18 @ 07:01  -  11-17-18 @ 07:00  --------------------------------------------------------  IN: 0 mL / OUT: 1 mL / NET: -1 mL    11-17-18 @ 07:01  -  11-17-18 @ 09:28  --------------------------------------------------------  IN: 0 mL / OUT: 400 mL / NET: -400 mL      PHYSICAL EXAM:  GENERAL: In no apparent distress  HEART: Regular rate and rhythm; No murmurs, rubs, or gallops.  PULMONARY: Clear to auscultation. No rales, wheezing, or rhonchi bilaterally.  ABDOMEN: Soft, Nontender, Nondistended; Bowel sounds present  EXTREMITIES:  2+ Peripheral Pulses, No clubbing, cyanosis, or edema, right great hallux discoloration, and erythema   NEUROLOGICAL: Grossly nonfocal  SKIN: Diffuse erythematous, macular coalescing rash with involvement of face, trunk, back, buttocks, groin, bilateral lower and upper extremities sparing palms and soles. no mucosal involvement multiple erosions with crusting with active blisters, blisters noted around right wrist, non tense    ===================== LABS =====================                        10.6   17.30 )-----------( 179      ( 17 Nov 2018 07:59 )             32.8     11-17    142  |  105  |  37<H>  ----------------------------<  83  4.0   |  23  |  1.0    Ca    8.1<L>      17 Nov 2018 07:59  Phos  2.2     11-17  Mg     2.2     11-17    ================== ALLERGIES ===================  Allergy Status Unknown    ==================== MEDS =====================  benztropine 0.5 milliGRAM(s) Oral two times a day  chlorhexidine 4% Liquid 1 Application(s) Topical <User Schedule>  doxycycline hyclate Capsule 100 milliGRAM(s) Oral every 12 hours  fluocinonide 0.05% Cream 1 Application(s) Topical every 12 hours  heparin  Injectable 5000 Unit(s) SubCutaneous every 8 hours  levETIRAcetam 500 milliGRAM(s) Oral two times a day  predniSONE   Tablet 60 milliGRAM(s) Oral daily    PRN MEDICATIONS  diphenhydrAMINE 25 milliGRAM(s) Oral every 4 hours PRN  hydrOXYzine hydrochloride 50 milliGRAM(s) Oral three times a day PRN    ================= ASSESS/PLAN ==================  Patient is a 73y old Male who presents with a chief complaint of s/p mechanical fall (16 Nov 2018 11:47)  Currently admitted to medicine with the primary diagnosis of Fever      PLAN  73 Yr M PMH Intellectual disability, seizure, HTN, MI, Thrombocytopenia, schizophrenia, ?? contact dermatitis presenting with mechanical fall    #mechanical fall   - neg trauma workup    #sepsis   - White count trended up today to 17, no fevers, hemodynamically stable   - Likely secondary to cellulitis, patient on doxycycline q12 rausch a 2 weeks course to end 11/29/2018  - blood cultures negative to date     #Bullous Pemphigoid   - Biopsy done by burn on 11/15/2018, results pending   - Patient on prednisone taper (60 mg until 11/22/2018 then 40 mg until 11/29/2018) with improvement of lesions   - c/w atarax, benadryl prn and flucinonide cream as per derm     #Hypertension   - Patient on Enalapril 20 qd normally, held on admission due to hypotension   - BP picked up, would consider restarting meds     # hx of mental retardation , schizophrenia from group home  - on home meds  - follows up w psych    #seizures  - cont keppra  - No seizure activities while inpatient       GI PPX: Pantoprozole  DVT PPX: Heparin SQ    DIET:  () Regular  /  (X) Cardiac  / () Renal  /  () Diabetic  /  () Fluid restriction  /   () NPO  ACTIVITY:  () Ad Rachel  /  (X) Advance as Tolerated  /  () Bed Rest  /  () Fall Precaution  /  () Seizure precaution    ================= DISPOSITION ==================    Patient to be discharged when condition(s) optimized.            Discharge to:  () Home  /  () SNF  /  () Hospice  /  (X) Group Home    ================= CODE STATUS =================                  (X) FULL CODE     |     () DNR     |     () DNI ________________________________________________________________________________  DAILY PROGRESS NOTE:    ================== SUBJECTIVE ==================    NURY MCMAHAN  /   73y  /  Male  /  MRN#: [id number]  Patient is a 73y old Male who presents with a chief complaint of s/p mechanical fall (16 Nov 2018 11:47)  Currently admitted to medicine with the primary diagnosis of Fever      HOSPITAL DAY: 4d     OVERNIGHT EVENTS: None    =================== OBJECTIVE ===================    VITAL SIGNS: Last 24 Hours  T(C): 36.2 (17 Nov 2018 05:00), Max: 36.5 (16 Nov 2018 20:03)  T(F): 97.1 (17 Nov 2018 05:00), Max: 97.7 (16 Nov 2018 20:03)  HR: 80 (17 Nov 2018 05:00) (80 - 88)  BP: 134/62 (17 Nov 2018 05:00) (130/68 - 152/67)  BP(mean): --  RR: 18 (17 Nov 2018 05:00) (18 - 18)  SpO2: 100% (16 Nov 2018 15:55) (100% - 100%)    11-16-18 @ 07:01  -  11-17-18 @ 07:00  --------------------------------------------------------  IN: 0 mL / OUT: 1 mL / NET: -1 mL    11-17-18 @ 07:01  -  11-17-18 @ 09:28  --------------------------------------------------------  IN: 0 mL / OUT: 400 mL / NET: -400 mL      PHYSICAL EXAM:  GENERAL: In no apparent distress  HEART: Regular rate and rhythm; No murmurs, rubs, or gallops.  PULMONARY: Clear to auscultation. No rales, wheezing, or rhonchi bilaterally.  ABDOMEN: Soft, Nontender, Nondistended; Bowel sounds present  EXTREMITIES:  2+ Peripheral Pulses, No clubbing, cyanosis, or edema, right great hallux discoloration, and erythema   NEUROLOGICAL: Grossly nonfocal  SKIN: Diffuse erythematous, macular coalescing rash with involvement of face, trunk, back, buttocks, groin, bilateral lower and upper extremities sparing palms and soles. no mucosal involvement multiple erosions with crusting with active blisters, blisters noted around right wrist, non tense    ===================== LABS =====================                        10.6   17.30 )-----------( 179      ( 17 Nov 2018 07:59 )             32.8     11-17    142  |  105  |  37<H>  ----------------------------<  83  4.0   |  23  |  1.0    Ca    8.1<L>      17 Nov 2018 07:59  Phos  2.2     11-17  Mg     2.2     11-17    ================== ALLERGIES ===================  Allergy Status Unknown    ==================== MEDS =====================  benztropine 0.5 milliGRAM(s) Oral two times a day  chlorhexidine 4% Liquid 1 Application(s) Topical <User Schedule>  doxycycline hyclate Capsule 100 milliGRAM(s) Oral every 12 hours  fluocinonide 0.05% Cream 1 Application(s) Topical every 12 hours  heparin  Injectable 5000 Unit(s) SubCutaneous every 8 hours  levETIRAcetam 500 milliGRAM(s) Oral two times a day  predniSONE   Tablet 60 milliGRAM(s) Oral daily    PRN MEDICATIONS  diphenhydrAMINE 25 milliGRAM(s) Oral every 4 hours PRN  hydrOXYzine hydrochloride 50 milliGRAM(s) Oral three times a day PRN    ================= ASSESS/PLAN ==================  Patient is a 73y old Male who presents with a chief complaint of s/p mechanical fall (16 Nov 2018 11:47)  Currently admitted to medicine with the primary diagnosis of Fever      PLAN  73 Yr M PMH Intellectual disability, seizure, HTN, MI, Thrombocytopenia, schizophrenia, ?? contact dermatitis presenting with mechanical fall    #mechanical fall   - neg trauma workup    #sepsis   - White count trended up today to 17, no fevers, hemodynamically stable   - Likely secondary to cellulitis, patient on doxycycline q12 to end 11/25/2018  - blood cultures negative to date     #Bullous Pemphigoid   - Biopsy done by burn on 11/15/2018, results pending   - Patient on prednisone taper (60 mg until 11/22/2018 then 40 mg until 11/29/2018) with improvement of lesions   - c/w atarax, benadryl prn and flucinonide cream as per derm     #Hypertension   - Patient on Enalapril 20 qd normally, held on admission due to hypotension   - BP picked up, would consider restarting meds     # hx of mental retardation , schizophrenia from group home  - on home meds  - follows up w psych    #seizures  - cont keppra  - No seizure activities while inpatient       GI PPX: Pantoprozole  DVT PPX: Heparin SQ    DIET:  () Regular  /  (X) Cardiac  / () Renal  /  () Diabetic  /  () Fluid restriction  /   () NPO  ACTIVITY:  () Ad Rachel  /  (X) Advance as Tolerated  /  () Bed Rest  /  () Fall Precaution  /  () Seizure precaution    ================= DISPOSITION ==================    Patient to be discharged when condition(s) optimized.            Discharge to:  () Home  /  () SNF  /  () Hospice  /  (X) Group Home    ================= CODE STATUS =================                  (X) FULL CODE     |     () DNR     |     () DNI ________________________________________________________________________________  DAILY PROGRESS NOTE:    ================== SUBJECTIVE ==================    NURY MCMAHAN  /   73y  /  Male  /  MRN#: [id number]  Patient is a 73y old Male who presents with a chief complaint of s/p mechanical fall (16 Nov 2018 11:47)  Currently admitted to medicine with the primary diagnosis of Fever      HOSPITAL DAY: 4d     OVERNIGHT EVENTS: None. Patient denies chest pain, dyspnea, fevers, chills or dysuria. Aid at bedside reports chronic cough.     =================== OBJECTIVE ===================    VITAL SIGNS: Last 24 Hours  T(C): 36.2 (17 Nov 2018 05:00), Max: 36.5 (16 Nov 2018 20:03)  T(F): 97.1 (17 Nov 2018 05:00), Max: 97.7 (16 Nov 2018 20:03)  HR: 80 (17 Nov 2018 05:00) (80 - 88)  BP: 134/62 (17 Nov 2018 05:00) (130/68 - 152/67)  BP(mean): --  RR: 18 (17 Nov 2018 05:00) (18 - 18)  SpO2: 100% (16 Nov 2018 15:55) (100% - 100%)    11-16-18 @ 07:01  -  11-17-18 @ 07:00  --------------------------------------------------------  IN: 0 mL / OUT: 1 mL / NET: -1 mL    11-17-18 @ 07:01  -  11-17-18 @ 09:28  --------------------------------------------------------  IN: 0 mL / OUT: 400 mL / NET: -400 mL      PHYSICAL EXAM:  GENERAL: In no apparent distress  HEART: Regular rate and rhythm; No murmurs, rubs, or gallops.  PULMONARY: Clear to auscultation. No rales, wheezing, or rhonchi bilaterally.  ABDOMEN: Soft, Nontender, Nondistended; Bowel sounds present  EXTREMITIES:  2+ Peripheral Pulses, No clubbing, cyanosis, or edema, right great hallux discoloration, and erythema   NEUROLOGICAL: Grossly nonfocal  SKIN: Diffuse erythematous, macular coalescing rash with involvement of face, trunk, back, buttocks, groin, bilateral lower and upper extremities sparing palms and soles. no mucosal involvement multiple erosions with crusting with active blisters, blisters noted around right wrist, non tense    ===================== LABS =====================                        10.6   17.30 )-----------( 179      ( 17 Nov 2018 07:59 )             32.8     11-17    142  |  105  |  37<H>  ----------------------------<  83  4.0   |  23  |  1.0    Ca    8.1<L>      17 Nov 2018 07:59  Phos  2.2     11-17  Mg     2.2     11-17    ================== ALLERGIES ===================  Allergy Status Unknown    ==================== MEDS =====================  benztropine 0.5 milliGRAM(s) Oral two times a day  chlorhexidine 4% Liquid 1 Application(s) Topical <User Schedule>  doxycycline hyclate Capsule 100 milliGRAM(s) Oral every 12 hours  fluocinonide 0.05% Cream 1 Application(s) Topical every 12 hours  heparin  Injectable 5000 Unit(s) SubCutaneous every 8 hours  levETIRAcetam 500 milliGRAM(s) Oral two times a day  predniSONE   Tablet 60 milliGRAM(s) Oral daily    PRN MEDICATIONS  diphenhydrAMINE 25 milliGRAM(s) Oral every 4 hours PRN  hydrOXYzine hydrochloride 50 milliGRAM(s) Oral three times a day PRN    ================= ASSESS/PLAN ==================  Patient is a 73y old Male who presents with a chief complaint of s/p mechanical fall (16 Nov 2018 11:47)  Currently admitted to medicine with the primary diagnosis of Fever      PLAN  73 Yr M PMH Intellectual disability, seizure, HTN, MI, Thrombocytopenia, schizophrenia, ?? contact dermatitis presenting with mechanical fall    #mechanical fall   - neg trauma workup    #sepsis   - White count trended up today to 17, no fevers, hemodynamically stable   - Likely secondary to cellulitis, patient on doxycycline q12 to end 11/25/2018  - blood cultures negative to date     #Bullous Pemphigoid   - Biopsy done by burn on 11/15/2018, results pending   - Patient on prednisone taper (60 mg until 11/22/2018 then 40 mg until 11/29/2018) with improvement of lesions   - c/w atarax, benadryl prn and flucinonide cream as per derm     #Hypertension   - Patient on Enalapril 20 qd normally, held on admission due to hypotension   - BP picked up, would consider restarting meds     # hx of mental retardation , schizophrenia from group home  - on home meds  - follows up w psych    #seizures  - cont keppra  - No seizure activities while inpatient       GI PPX: Pantoprozole  DVT PPX: Heparin SQ    DIET:  () Regular  /  (X) Cardiac  / () Renal  /  () Diabetic  /  () Fluid restriction  /   () NPO  ACTIVITY:  () Ad Rachel  /  (X) Advance as Tolerated  /  () Bed Rest  /  () Fall Precaution  /  () Seizure precaution    ================= DISPOSITION ==================    Patient to be discharged when condition(s) optimized.            Discharge to:  () Home  /  () SNF  /  () Hospice  /  (X) Group Home    ================= CODE STATUS =================                  (X) FULL CODE     |     () DNR     |     () DNI

## 2018-11-17 NOTE — DISCHARGE NOTE ADULT - PATIENT PORTAL LINK FT
You can access the [email or web address] Portal, offered by [facility], by registering with the following website: [email or web address]

## 2018-11-17 NOTE — CONSULT NOTE ADULT - CONSULT REASON
Rash all over body, sparing palms and soles
Toe cellulites
diffuse rash, for skin biopsy
Rash all over the body sparing only palms and feet. plaques with erythematous base with scaring and some bullae and some desquamation

## 2018-11-17 NOTE — DISCHARGE NOTE ADULT - MEDICATION SUMMARY - MEDICATIONS TO TAKE
I will START or STAY ON the medications listed below when I get home from the hospital:    predniSONE 20 mg oral tablet  -- 3 tab(s) by mouth once a day until 11/22/2018 then 2 tablets once a day until 11/29/2018  -- Indication: For Bullous Pemphigoid     enalapril 20 mg oral tablet  -- 1 tab(s) by mouth once a day  -- Indication: For Hypertension     Keppra 500 mg oral tablet  -- 1 tab(s) by mouth 2 times a day  -- Indication: For Seizure     doxycycline monohydrate 100 mg oral capsule  -- 1 cap(s) by mouth every 12 hours until 11/29/2018  -- Indication: For Cellulitis     benztropine 0.5 mg oral tablet  -- 1 tab(s) by mouth 2 times a day  -- Indication: For Schizephrenia     Prolixin Decanoate 25 mg/mL injectable solution  -- 0.5 milliliter(s) injectable every 2 weeks  -- Indication: For Schizephrenia     fluocinonide 0.05% topical cream  -- 1 application on skin every 12 hours  -- Indication: For Bullous Pemphigoid     pantoprazole 40 mg oral delayed release tablet  -- 1 tab(s) by mouth once a day (before a meal)  -- Indication: For Gastric Protection     Calcium 500+D oral tablet, chewable  -- 1 tab(s) by mouth 2 times a day  -- Indication: For Health Maintenance I will START or STAY ON the medications listed below when I get home from the hospital:    predniSONE 20 mg oral tablet  -- 3 tab(s) by mouth once a day until 11/22/2018 then 2 tablets once a day until 11/29/2018  -- Indication: For bullous pemphigoid    enalapril 20 mg oral tablet  -- 1 tab(s) by mouth once a day  -- Indication: For Hypertension     Keppra 500 mg oral tablet  -- 1 tab(s) by mouth 2 times a day  -- Indication: For Seizure     doxycycline monohydrate 100 mg oral capsule  -- 1 cap(s) by mouth every 12 hours until 11/29/2018  -- Indication: For Cellulitis    benztropine 0.5 mg oral tablet  -- 1 tab(s) by mouth 2 times a day  -- Indication: For Schizephrenia     Prolixin Decanoate 25 mg/mL injectable solution  -- 0.5 milliliter(s) injectable every 2 weeks  -- Indication: For Schizephrenia     fluocinonide 0.05% topical cream  -- 1 application on skin every 12 hours  -- Indication: For bullous pemphigoid    pantoprazole 40 mg oral delayed release tablet  -- 1 tab(s) by mouth once a day (before a meal)  -- Indication: For gerd    Calcium 500+D oral tablet, chewable  -- 1 tab(s) by mouth 2 times a day  -- Indication: For Health Maintenance

## 2018-11-17 NOTE — CONSULT NOTE ADULT - ATTENDING COMMENTS
Patient was evaluated independently at bedside on 11/19. No open ulcerations on the foot appreciated. Patient can follow up as outpatient for management of onychomycosis     Yifan Corbin DPM  [address] [phone]
Above reviewed
I have personally examined the patient and reviewed the documentation above.  Corrections and edits were made wherever needed.

## 2018-11-17 NOTE — PROGRESS NOTE ADULT - ASSESSMENT
IMPRESSION:  Dermatitis significantly better  BCs NTD    RECOMMENDATIONS:  8 days of po Doxy  Recall prn please

## 2018-11-17 NOTE — DISCHARGE NOTE ADULT - CARE PROVIDERS DIRECT ADDRESSES
,DirectAddress_Unknown,DirectAddress_Unknown ,DirectAddress_Unknown,DirectAddress_Unknown,[email or web address]

## 2018-11-17 NOTE — PROGRESS NOTE ADULT - ATTENDING COMMENTS
Patient is seen and examined independently. I agree with resident note above and plan of care -edited and corrected where applicable- with addition:     72 yo M with PMHx of intellectual disability, seizure, HTN, MI, Thrombocytopenia, schizophrenia and dermatitis presented s/p mechanical fall. Thought to have possible sepsis 2/2 cellulitis. However per ID and derm, rash consistent with bullous pemphigoid. S/p biopsy by burn, results pending.     Assessment:   - Diffuse erythematous, macular rash w/ crusting lesions likely 2/2 bullous pemphigoid  - Leucocytosis w/ eosinophile likely reactive  - Right Great Toe Erythema (without pus, not infected)  - HTN  - Hx of mental retardation, Schizophrenia   - Hx of seizures  - DVT ppx    Plan:  - per derm recs, prednisone 60mg daily x 1 week and then 40mg x 1 week with close outpatient derm follow-up for further monitoring of rash and taper of prednisone. Pt will likely require lifetime treatment for bullous pemphigoid which will be determined as outpt after rash resolution   - c/w fluocinonide cream  - c/w doxy 100mg q12h for 8 days  - derm recs appreciated  - ID recs appreciated   - pt remains afebrile however w/ increase wbc and hx of cough will repeat CXR  - restart home med Enalapril 20mg qd   - c/w keppra  - HSQ for DVT ppx    Pt with prior hx of dermatitis w/ no workup therefore SW on board for safe d/c planning. Plan to d/c pt w/ prednisone taper and close derm f/u once acceptable facility is determined. Patient is seen and examined independently. I agree with resident note above and plan of care -edited and corrected where applicable- with addition:     74 yo M with PMHx of intellectual disability, seizure, HTN, MI, Thrombocytopenia, schizophrenia and dermatitis presented s/p mechanical fall. Thought to have possible sepsis 2/2 cellulitis. However per ID and derm, rash consistent with bullous pemphigoid. S/p biopsy by burn, results pending.     Assessment:   - Diffuse erythematous, macular rash w/ crusting lesions likely 2/2 bullous pemphigoid  - Leucocytosis w/ eosinophile likely reactive  - Right Great Toe Erythema (without pus, not infected)  - HTN  - Hx of mental retardation, Schizophrenia   - Hx of seizures  - DVT ppx    Plan:  - per derm recs, prednisone 60mg daily x 1 week and then 40mg x 1 week with close outpatient derm follow-up for further monitoring of rash and taper of prednisone. Pt will likely require lifetime treatment for bullous pemphigoid which will be determined as outpt after rash resolution   - c/w fluocinonide cream  - c/w doxy 100mg q12h for 8 days  - derm recs appreciated  - ID recs appreciated   - pt remains afebrile however w/ increase wbc and hx of cough will repeat CXR  - restart home med Enalapril at 10mg qd (increase as tolerated to home dose of 20mg)  - c/w keppra  - HSQ for DVT ppx    Pt with prior hx of dermatitis w/ no workup therefore SW on board for safe d/c planning. Plan to d/c pt w/ prednisone taper and close derm f/u once acceptable facility is determined.

## 2018-11-17 NOTE — DISCHARGE NOTE ADULT - PLAN OF CARE
Resolution You have a dermitis likely due to bullous pemphigoid. A biopsy has been taken   Please take the prednisone as prescribed, and continue with the fluocinonide cream until resolution   Follow up with your primary care physician and dermatologist for results on the biopsy and further treatment recommendations You also had cellulitis of the right toe treated with antibiotics with improvement    Please take the doxycycline as prescribed until 11/25/2018 Control At admission you had low BP which has picked up after treatment.   The Enalapril was held however you can restart taking it as prescribed Stability Stable. Please take your medications as prescribed and follow up with your primary care physician manage and prevent complications please elevate your legs  please follow up with your primary care doctor.

## 2018-11-17 NOTE — DISCHARGE NOTE ADULT - PROVIDER TOKENS
TOKSUSIE:'[id number]',TOKEN:'[id number]' TOKEN:'[id number]:'[id number]:'[id number]'

## 2018-11-17 NOTE — CONSULT NOTE ADULT - SUBJECTIVE AND OBJECTIVE BOX
PODIATRY CONSULT   NURY MCMAHAN is a 73y old  Male who presents with a chief complaint of s/p mechanical fall (17 Nov 2018 09:29)    HPI:  73 Yr M PMH Intellectual disability, seizure, HTN, MI, Thrombocytopenia, schizophrenia, ?? contact dermatitis presenting with mechanical fall. Pt lives in [facility] home and is accompanied by aid. Pt provides very limited hx. Aid Reports pt slipped in ran and fell, he hit hx face and scrapped his nose. Today pt had appointment with podiatry when he was found to be hypotensive and became dizzy upon standing. Pt has a hx of skin rash that was previously seen by dermatology 4 month ago, biopsy was taken and diagnosed with contact dermatitis Since the rash has become worse. Pt reports chills and subjective fever. Denies Abd pain, nausea, vomiting, chest pain, diarrhea.  On presentation /49  Tmax 100.1. WBC 8 with 29.6% bands and sodium 148. Trauma workup neg (13 Nov 2018 20:01)    FEVER;BANDEMIA;DERMATITIS  Schizophrenia  Mental retardation, idiopathic mild  Hypertension  Seizure disorder  Hypertension  Bipolar 1 disorder  Hypothyroid      PMH: FEVER;BANDEMIA;DERMATITIS  Schizophrenia  Mental retardation, idiopathic mild  Hypertension  Seizure disorder  Hypertension  Bipolar 1 disorder  Hypothyroid    PSH: No significant past surgical history    Medication doxycycline hyclate Capsule 100 milliGRAM(s) Oral every 12 hours    Allergy: Allergy Status Unknown        Labs:                        10.6   17.30 )-----------( 179      ( 17 Nov 2018 07:59 )             32.8       11-17    142  |  105  |  37<H>  ----------------------------<  83  4.0   |  23  |  1.0    Ca    8.1<L>      17 Nov 2018 07:59  Phos  2.2     11-17  Mg     2.2     11-17              O:   Derm: No open Ulceration b/l feet. Mild xerosis b/l feet. Erythema plantar feet b/l. Mycotic hallux b/l. - active sign of infection. piting edema b/l feet.   Vascular: Dorsalis Pedis and Posterior Tibial pulses -1/4 b/l.  Capillary re-fill time less then 3 seconds digits 1-5 bilateral.  B/L feet warm to touch  Neuro: Protective sensation intact to the level of the digits bilateral.  MSK: Muscle strength 5/5 all major muscle groups bilateral.        Assessment:   Erythema b/l plantar feet, Mycotic nails, No signs of infection  P:  Chart reviewed and Patient evaluated  Discussed diagnosis and treatment with patient  No dressing  f/u ID recommendations  Continue IV abx as per ID   will discuss care plan  with all  Attendings ------ PODIATRY CONSULT   NURY MCMAHAN is a 73y old  Male who presents with a chief complaint of s/p mechanical fall (17 Nov 2018 09:29)    HPI:  73 Yr M PMH Intellectual disability, seizure, HTN, MI, Thrombocytopenia, schizophrenia, ?? contact dermatitis presenting with mechanical fall. Pt lives in [facility] home and is accompanied by aid. Pt provides very limited hx. Aid Reports pt slipped in ran and fell, he hit hx face and scrapped his nose. Today pt had appointment with podiatry when he was found to be hypotensive and became dizzy upon standing. Pt has a hx of skin rash that was previously seen by dermatology 4 month ago, biopsy was taken and diagnosed with contact dermatitis Since the rash has become worse. Pt reports chills and subjective fever. Denies Abd pain, nausea, vomiting, chest pain, diarrhea.  On presentation /49  Tmax 100.1. WBC 8 with 29.6% bands and sodium 148. Trauma workup neg (13 Nov 2018 20:01)    FEVER;BANDEMIA;DERMATITIS  Schizophrenia  Mental retardation, idiopathic mild  Hypertension  Seizure disorder  Hypertension  Bipolar 1 disorder  Hypothyroid      PMH: FEVER;BANDEMIA;DERMATITIS  Schizophrenia  Mental retardation, idiopathic mild  Hypertension  Seizure disorder  Hypertension  Bipolar 1 disorder  Hypothyroid    PSH: No significant past surgical history    Medication doxycycline hyclate Capsule 100 milliGRAM(s) Oral every 12 hours    Allergy: Allergy Status Unknown        Labs:                        10.6   17.30 )-----------( 179      ( 17 Nov 2018 07:59 )             32.8       11-17    142  |  105  |  37<H>  ----------------------------<  83  4.0   |  23  |  1.0    Ca    8.1<L>      17 Nov 2018 07:59  Phos  2.2     11-17  Mg     2.2     11-17              O:   Derm: No open Ulceration b/l feet. Mild xerosis b/l feet. Erythema plantar feet b/l. Mycotic hallux b/l. - active sign of infection. piting edema b/l feet.   Vascular: Dorsalis Pedis and Posterior Tibial pulses -1/4 b/l.  Capillary re-fill time less then 3 seconds digits 1-5 bilateral.  B/L feet warm to touch  Neuro: Protective sensation intact to the level of the digits bilateral.  MSK: Muscle strength 5/5 all major muscle groups bilateral.        Assessment:   Erythema b/l plantar feet, Mycotic nails, No signs of infection  P:  Chart reviewed and Patient evaluated  Discussed diagnosis and treatment with patient  No dressing  f/u ID recommendations  Continue IV abx as per ID  pt stable to dc from podiatry  f/u as an outpatient at podiatry clinic with Dr. leach as needed.  re consult as needed

## 2018-11-17 NOTE — DISCHARGE NOTE ADULT - HOSPITAL COURSE
73 Yr M PMH Intellectual disability, seizure, HTN, MI, Thrombocytopenia, schizophrenia, ?? contact dermatitis presenting with mechanical fall. Pt lives in [facility] group home and is accompanied by aid. Pt provides very limited hx. Aid Reports pt slipped in ran and fell, he hit hx face and scrapped his nose. Today pt had appointment with podiatry when he was found to be hypotensive and became dizzy upon standing. Pt has a hx of skin rash that was previously seen by dermatology 4 month ago, biopsy was taken and diagnosed with contact dermatitis Since the rash has become worse. Pt reports chills and subjective fever. Denies Abd pain, nausea, vomiting, chest pain, diarrhea.  On presentation /49  Tmax 100.1. WBC 8 with 29.6% bands and sodium 148. Trauma workup neg    Patient was found to have extensive skin lesions likely related to cellulitis vs bullous pemphigoid as per derm.   Was started on doxycycline q12 to end 11/25/2018 and Biopsy done by burn on 11/15/2018, results pending and patient on prednisone taper (60 mg until 11/22/2018 then 40 mg until 11/29/2018) with significant improvement of lesions   He will likely be discharged on Monday 11/19/2018 once placement is available. He will follow up with PCP and Dermatologist for biopsy result and further treatment recommendations 73 Yr M PMH Intellectual disability, seizure, HTN, MI, Thrombocytopenia, schizophrenia, ?? contact dermatitis presenting with mechanical fall. Pt lives in [facility] group [place] and is accompanied by aid. Pt provides very limited hx. Aid Reports pt slipped in ran and fell, he hit hx face and scrapped his nose. Today pt had appointment with podiatry when he was found to be hypotensive and became dizzy upon standing. Pt has a hx of skin rash that was previously seen by dermatology 4 month ago, biopsy was taken and diagnosed with contact dermatitis Since the rash has become worse. Pt reports chills and subjective fever. Denies Abd pain, nausea, vomiting, chest pain, diarrhea.  On presentation /49  Tmax 100.1. WBC 8 with 29.6% bands and sodium 148. Trauma workup neg    Patient was found to have extensive skin lesions likely related to cellulitis vs bullous pemphigoid as per derm.   Was started on doxycycline q12 to end 11/25/2018 and Biopsy done by burn on 11/15/2018, results pending and patient on prednisone taper (60 mg until 11/22/2018 then 40 mg until 11/29/2018) with significant improvement of lesions   11/19/18  The ptient has aid at bedside, brock acute complaints this am. Group home contacted for evaluation prior to discharge. Preliminary discharge paperwork given to [facility] containing medications that he will be discharged with. to be sent to [facility] pharmacy in [facility].   informed group home that all other test results and entire chart will be available from medical records after the discharge is finalized.   Patient had biopsy on 11/15/19, results still pending. called [phone] (pathology office) for this update. Patient can follow up with burn next tuesday in clinic for results of the biopsy.    11/20/18  The patient was noted this am to have acute, new unilateral swelling of the left lower extremity without erythema or warmth to the touch. Duplex was ordered. If negative the patient is considered stable for discharge with elevation of the leg.  And outpatient follow up. Vascular notified that this is pending discharge. Prelim report was negative for DVt, awaiting final prior to discharge.     He can follow up with Burn service on 11/27/18 for biopsy results per burn PA. Number to call is [phone], pathology for biopsy results. He can also follow up with PCP and Dermatologist for biopsy result and further treatment recommendations. 73 Yr M PMH Intellectual disability, seizure, HTN, MI, Thrombocytopenia, schizophrenia, ?? contact dermatitis presenting with mechanical fall. Pt lives in [facility] group [place] and is accompanied by aid. Pt provides very limited hx. Aid Reports pt slipped in ran and fell, he hit hx face and scrapped his nose. Today pt had appointment with podiatry when he was found to be hypotensive and became dizzy upon standing. Pt has a hx of skin rash that was previously seen by dermatology 4 month ago, biopsy was taken and diagnosed with contact dermatitis Since the rash has become worse. Pt reports chills and subjective fever. Denies Abd pain, nausea, vomiting, chest pain, diarrhea.  On presentation /49  Tmax 100.1. WBC 8 with 29.6% bands and sodium 148. Trauma workup neg    Patient was found to have extensive skin lesions likely related to cellulitis vs bullous pemphigoid as per derm.   Was started on doxycycline q12 to end 11/25/2018 and Biopsy done by burn on 11/15/2018, results pending and patient on prednisone taper (60 mg until 11/22/2018 then 40 mg until 11/29/2018) with significant improvement of lesions   11/19/18  The ptient has aid at bedside, brock acute complaints this am. Group home contacted for evaluation prior to discharge. Preliminary discharge paperwork given to [facility] containing medications that he will be discharged with. to be sent to [facility] pharmacy in [facility].   informed group home that all other test results and entire chart will be available from medical records after the discharge is finalized.   Patient had biopsy on 11/15/19, results still pending. called [phone] (pathology office) for this update. Patient can follow up with burn next tuesday in clinic for results of the biopsy.    11/20/18  The patient was noted this am to have acute, new unilateral swelling of the left lower extremity without erythema or warmth to the touch. Duplex was ordered. Vascular notified that this is pending discharge. Prelim report was negative for DVt. The final report was received am of 11/21/18, and it was negative. The aptient is considered medicaly stable for discharge to the group home this am.     He can follow up with Burn service on 11/27/18 for biopsy results per burn PA. Number to call is [phone], pathology for biopsy results. He can also follow up with PCP and Dermatologist for biopsy result and further treatment recommendations.

## 2018-11-18 LAB
CULTURE RESULTS: SIGNIFICANT CHANGE UP
HCT VFR BLD CALC: 32 % — LOW (ref 42–52)
HGB BLD-MCNC: 10.4 G/DL — LOW (ref 14–18)
MCHC RBC-ENTMCNC: 28.7 PG — SIGNIFICANT CHANGE UP (ref 27–31)
MCHC RBC-ENTMCNC: 32.5 G/DL — SIGNIFICANT CHANGE UP (ref 32–37)
MCV RBC AUTO: 88.2 FL — SIGNIFICANT CHANGE UP (ref 80–94)
NRBC # BLD: 0 /100 WBCS — SIGNIFICANT CHANGE UP (ref 0–0)
PLATELET # BLD AUTO: 177 K/UL — SIGNIFICANT CHANGE UP (ref 130–400)
RBC # BLD: 3.63 M/UL — LOW (ref 4.7–6.1)
RBC # FLD: 15.5 % — HIGH (ref 11.5–14.5)
SPECIMEN SOURCE: SIGNIFICANT CHANGE UP
WBC # BLD: 18.8 K/UL — HIGH (ref 4.8–10.8)
WBC # FLD AUTO: 18.8 K/UL — HIGH (ref 4.8–10.8)

## 2018-11-18 RX ADMIN — Medication 100 MILLIGRAM(S): at 17:21

## 2018-11-18 RX ADMIN — Medication 0.5 MILLIGRAM(S): at 06:17

## 2018-11-18 RX ADMIN — HEPARIN SODIUM 5000 UNIT(S): 5000 INJECTION INTRAVENOUS; SUBCUTANEOUS at 06:19

## 2018-11-18 RX ADMIN — Medication 0.5 MILLIGRAM(S): at 17:21

## 2018-11-18 RX ADMIN — HEPARIN SODIUM 5000 UNIT(S): 5000 INJECTION INTRAVENOUS; SUBCUTANEOUS at 13:20

## 2018-11-18 RX ADMIN — Medication 1 APPLICATION(S): at 06:18

## 2018-11-18 RX ADMIN — Medication 10 MILLIGRAM(S): at 06:17

## 2018-11-18 RX ADMIN — Medication 1 APPLICATION(S): at 17:42

## 2018-11-18 RX ADMIN — Medication 100 MILLIGRAM(S): at 06:17

## 2018-11-18 RX ADMIN — HEPARIN SODIUM 5000 UNIT(S): 5000 INJECTION INTRAVENOUS; SUBCUTANEOUS at 21:28

## 2018-11-18 RX ADMIN — LEVETIRACETAM 500 MILLIGRAM(S): 250 TABLET, FILM COATED ORAL at 17:22

## 2018-11-18 RX ADMIN — PANTOPRAZOLE SODIUM 40 MILLIGRAM(S): 20 TABLET, DELAYED RELEASE ORAL at 06:18

## 2018-11-18 RX ADMIN — Medication 60 MILLIGRAM(S): at 06:18

## 2018-11-18 RX ADMIN — LEVETIRACETAM 500 MILLIGRAM(S): 250 TABLET, FILM COATED ORAL at 06:18

## 2018-11-18 NOTE — PROGRESS NOTE ADULT - SUBJECTIVE AND OBJECTIVE BOX
MARTIRNURY  73y, Male  Allergy: Allergy Status Unknown    Hospital Day: 5d    Patient seen and examined earlier today. Reports some pruritis over rash. Denies fevers, chills, N/V/D, dyspnea. CXR repeated yesterday due to cough and increasing wbc (likely 2/2 steroids) which was unremarkable)     PMH/PSH:  PAST MEDICAL & SURGICAL HISTORY:  Schizophrenia  Mental retardation, idiopathic mild  Hypertension  Seizure disorder  No significant past surgical history        VITALS:  T(F): 97.5 (11-18-18 @ 14:21), Max: 97.8 (11-18-18 @ 05:41)  HR: 94 (11-18-18 @ 14:21)  BP: 139/65 (11-18-18 @ 14:21) (139/65 - 171/72)  RR: 18 (11-18-18 @ 14:21)  SpO2: --    TESTS & MEASUREMENTS:  Weight (Kg): 126.6 (11-16-18 @ 20:03)  BMI (kg/m2): 37.9 (11-16)    11-16-18 @ 07:01  -  11-17-18 @ 07:00  --------------------------------------------------------  IN: 0 mL / OUT: 1 mL / NET: -1 mL    11-17-18 @ 07:01  -  11-18-18 @ 07:00  --------------------------------------------------------  IN: 0 mL / OUT: 400 mL / NET: -400 mL                            10.4   18.80 )-----------( 177      ( 18 Nov 2018 06:43 )             32.0       11-17    142  |  105  |  37<H>  ----------------------------<  83  4.0   |  23  |  1.0    Ca    8.1<L>      17 Nov 2018 07:59  Phos  2.2     11-17  Mg     2.2     11-17              Culture - Blood (collected 11-14-18 @ 07:44)  Source: .Blood None  Preliminary Report (11-15-18 @ 15:01):    No growth to date.    Culture - Blood (collected 11-14-18 @ 03:14)  Source: .Blood None  Preliminary Report (11-15-18 @ 15:01):    No growth to date.    Culture - Urine (collected 11-13-18 @ 17:20)  Source: .Urine Clean Catch (Midstream)  Final Report (11-14-18 @ 21:21):    Culture grew 3 or more types of organisms which indicate    collection contamination; consider recollection only if clinically    indicated.    Culture - Blood (collected 11-13-18 @ 14:46)  Source: .Blood Blood  Preliminary Report (11-14-18 @ 23:01):    No growth to date.          RADIOLOGY & ADDITIONAL TESTS:  - CXR reviewed by me: no acute pulm pathology noted        MEDICATIONS:  MEDICATIONS  (STANDING):  benztropine 0.5 milliGRAM(s) Oral two times a day  chlorhexidine 4% Liquid 1 Application(s) Topical <User Schedule>  doxycycline hyclate Capsule 100 milliGRAM(s) Oral every 12 hours  enalapril 10 milliGRAM(s) Oral daily  fluocinonide 0.05% Cream 1 Application(s) Topical every 12 hours  heparin  Injectable 5000 Unit(s) SubCutaneous every 8 hours  levETIRAcetam 500 milliGRAM(s) Oral two times a day  pantoprazole    Tablet 40 milliGRAM(s) Oral before breakfast  predniSONE   Tablet 60 milliGRAM(s) Oral daily    MEDICATIONS  (PRN):  diphenhydrAMINE 25 milliGRAM(s) Oral every 4 hours PRN Rash and/or Itching  hydrOXYzine hydrochloride 50 milliGRAM(s) Oral three times a day PRN Itching      HOME MEDICATIONS:  benztropine 0.5 mg oral tablet (11-13)  Calcium 500+D oral tablet, chewable (11-13)  doxycycline monohydrate 100 mg oral capsule (11-17)  enalapril 20 mg oral tablet (11-13)  fluocinonide 0.05% topical cream (11-17)  Keppra 500 mg oral tablet (11-13)  pantoprazole 40 mg oral delayed release tablet (11-17)  predniSONE 20 mg oral tablet (11-17)  Prolixin Decanoate 25 mg/mL injectable solution (11-13)      PHYSICAL EXAM:    PHYSICAL EXAM:  GENERAL: elderly male, resting in bed, awake and alert, NAD, well-developed  HEAD:  Atraumatic  EYES: EOMI, PERRLA  NECK: Supple, No JVD  CHEST/LUNG: Clear to auscultation bilaterally; No wheeze  HEART: Regular rate and rhythm; No murmurs, rubs, or gallops  ABDOMEN: Soft, Nontender, Nondistended; Bowel sounds present  EXTREMITIES:  2+ Peripheral Pulses  NEUROLOGY: non-focal  SKIN: Diffuse erythematous, macular coalescing rash with involvement of face, trunk, back, buttocks, groin, bilateral lower and upper extremities sparing palms and soles improving w/ less erythema and crusting over erosions

## 2018-11-18 NOTE — PROGRESS NOTE ADULT - ASSESSMENT
74 yo M with PMHx of intellectual disability, seizure, HTN, MI, Thrombocytopenia, schizophrenia and dermatitis presented s/p mechanical fall. Thought to have possible sepsis 2/2 cellulitis. However per ID and derm, rash consistent with bullous pemphigoid. S/p biopsy by burn, results pending.     Assessment:   - Diffuse erythematous, macular rash w/ crusting lesions likely 2/2 bullous pemphigoid  - Leucocytosis w/ likely 2/2 steroids   - Right Great Toe Erythema (without pus, not infected)  - HTN  - Hx of mental retardation, Schizophrenia   - Hx of seizures  - DVT ppx    Plan:  - per derm recs, prednisone 60mg daily x 1 week and then 40mg x 1 week with close outpatient derm follow-up for further monitoring of rash and taper of prednisone. Pt will likely require lifetime treatment for bullous pemphigoid which will be determined as outpt after rash resolution   - c/w fluocinonide cream  - c/w doxy 100mg q12h for 7 days  - derm recs appreciated  - ID recs appreciated   - pt remains afebrile however w/ increase wbc and hx of cough CXR was repeated, no evidence of infection   - increase Enalapril to home dose 20mg qd  - c/w keppra  - HSQ for DVT ppx    Pt with prior hx of dermatitis w/ no workup therefore SW on board for safe d/c planning. Plan to d/c pt w/ prednisone taper and close derm f/u once acceptable facility is determined. Meeting scheduled for Monday.

## 2018-11-19 LAB
CULTURE RESULTS: SIGNIFICANT CHANGE UP
CULTURE RESULTS: SIGNIFICANT CHANGE UP
SPECIMEN SOURCE: SIGNIFICANT CHANGE UP
SPECIMEN SOURCE: SIGNIFICANT CHANGE UP

## 2018-11-19 PROCEDURE — 99221 1ST HOSP IP/OBS SF/LOW 40: CPT

## 2018-11-19 RX ORDER — MAGNESIUM OXIDE 400 MG ORAL TABLET 241.3 MG
400 TABLET ORAL ONCE
Qty: 0 | Refills: 0 | Status: COMPLETED | OUTPATIENT
Start: 2018-11-19 | End: 2018-11-19

## 2018-11-19 RX ORDER — MAGNESIUM SULFATE 500 MG/ML
2 VIAL (ML) INJECTION ONCE
Qty: 0 | Refills: 0 | Status: COMPLETED | OUTPATIENT
Start: 2018-11-19 | End: 2018-11-19

## 2018-11-19 RX ADMIN — Medication 100 MILLIGRAM(S): at 06:04

## 2018-11-19 RX ADMIN — Medication 20 MILLIGRAM(S): at 06:04

## 2018-11-19 RX ADMIN — Medication 50 GRAM(S): at 14:30

## 2018-11-19 RX ADMIN — HEPARIN SODIUM 5000 UNIT(S): 5000 INJECTION INTRAVENOUS; SUBCUTANEOUS at 13:18

## 2018-11-19 RX ADMIN — Medication 100 MILLIGRAM(S): at 18:42

## 2018-11-19 RX ADMIN — PANTOPRAZOLE SODIUM 40 MILLIGRAM(S): 20 TABLET, DELAYED RELEASE ORAL at 06:06

## 2018-11-19 RX ADMIN — Medication 0.5 MILLIGRAM(S): at 06:04

## 2018-11-19 RX ADMIN — HEPARIN SODIUM 5000 UNIT(S): 5000 INJECTION INTRAVENOUS; SUBCUTANEOUS at 06:04

## 2018-11-19 RX ADMIN — Medication 0.5 MILLIGRAM(S): at 18:42

## 2018-11-19 RX ADMIN — Medication 50 MILLIGRAM(S): at 21:56

## 2018-11-19 RX ADMIN — Medication 25 MILLIGRAM(S): at 21:56

## 2018-11-19 RX ADMIN — LEVETIRACETAM 500 MILLIGRAM(S): 250 TABLET, FILM COATED ORAL at 06:05

## 2018-11-19 RX ADMIN — Medication 1 APPLICATION(S): at 18:42

## 2018-11-19 RX ADMIN — MAGNESIUM OXIDE 400 MG ORAL TABLET 400 MILLIGRAM(S): 241.3 TABLET ORAL at 18:42

## 2018-11-19 RX ADMIN — Medication 1 APPLICATION(S): at 06:06

## 2018-11-19 RX ADMIN — Medication 60 MILLIGRAM(S): at 06:04

## 2018-11-19 RX ADMIN — HEPARIN SODIUM 5000 UNIT(S): 5000 INJECTION INTRAVENOUS; SUBCUTANEOUS at 21:34

## 2018-11-19 RX ADMIN — LEVETIRACETAM 500 MILLIGRAM(S): 250 TABLET, FILM COATED ORAL at 18:42

## 2018-11-19 NOTE — PROGRESS NOTE ADULT - ASSESSMENT
72 yo M with PMHx of intellectual disability, seizure, HTN, MI, Thrombocytopenia, schizophrenia and dermatitis presented s/p mechanical fall. Thought to have possible sepsis 2/2 cellulitis. However per ID and derm, rash consistent with bullous pemphigoid. S/p biopsy by burn, results pending.     Assessment:   - Diffuse erythematous, macular rash w/ crusting lesions likely 2/2 bullous pemphigoid  - Leucocytosis w/ likely 2/2 steroids   - Right Great Toe Erythema (without pus, not infected)  - HTN  - Hx of mental retardation, Schizophrenia   - Hx of seizures  - DVT ppx    Plan:  - sterioids per derm, fu bx   - c/w fluocinonide cream  - c/w doxy 100mg q12h for 7 days  - derm recs appreciated  - ID recs appreciated   - pt remains afebrile however w/ increase wbc and hx of cough CXR was repeated, no evidence of infection   - increase Enalapril to home dose 20mg qd  - c/w keppra  - HSQ for DVT ppx  - mtg today DC will be tomorrow 11/20/18

## 2018-11-19 NOTE — PROGRESS NOTE ADULT - SUBJECTIVE AND OBJECTIVE BOX
Pt seen and examined at bedside. No CP or SOB.    PAST MEDICAL & SURGICAL HISTORY:  Schizophrenia  Mental retardation, idiopathic mild  Hypertension  Seizure disorder  No significant past surgical history      VITAL SIGNS (Last 24 hrs):  T(C): 36.3 (11-19-18 @ 13:58), Max: 36.3 (11-19-18 @ 13:58)  HR: 80 (11-19-18 @ 13:58) (75 - 83)  BP: 135/61 (11-19-18 @ 13:58) (135/61 - 159/68)  RR: 18 (11-19-18 @ 13:58) (18 - 20)  SpO2: --  Wt(kg): --  Daily     Daily     I&O's Summary    19 Nov 2018 07:01  -  19 Nov 2018 16:27  --------------------------------------------------------  IN: 680 mL / OUT: 900 mL / NET: -220 mL        PHYSICAL EXAM:  GENERAL: NAD, well-developed  HEAD:  Atraumatic, Normocephalic  EYES: EOMI, PERRLA, conjunctiva and sclera clear  NECK: Supple, No JVD  CHEST/LUNG: Clear to auscultation bilaterally; No wheeze  HEART: Regular rate and rhythm; No murmurs, rubs, or gallops  ABDOMEN: Soft, Nontender, Nondistended; Bowel sounds present  EXTREMITIES:  2+ Peripheral Pulses, No clubbing, cyanosis, or edema  PSYCH: AAOx3  NEUROLOGY: non-focal  SKIN: No rashes or lesions    Labs Reviewed  Spoke to patient in regards to abnormal labs.    CBC Full  -  ( 18 Nov 2018 06:43 )  WBC Count : 18.80 K/uL  Hemoglobin : 10.4 g/dL  Hematocrit : 32.0 %  Platelet Count - Automated : 177 K/uL  Mean Cell Volume : 88.2 fL  Mean Cell Hemoglobin : 28.7 pg  Mean Cell Hemoglobin Concentration : 32.5 g/dL  Auto Neutrophil # : x  Auto Lymphocyte # : x  Auto Monocyte # : x  Auto Eosinophil # : x  Auto Basophil # : x  Auto Neutrophil % : x  Auto Lymphocyte % : x  Auto Monocyte % : x  Auto Eosinophil % : x  Auto Basophil % : x    BMP:    11-17 @ 07:59    Blood Urea Nitrogen - 37  Calcium - 8.1  Carbond Dioxide - 23  Chloride - 105  Creatinine - 1.0  Glucose - 83  Potassium - 4.0  Sodium - 142      Hemoglobin A1c -     Urine Culture:        Imaging reviewed      MEDICATIONS  (STANDING):  benztropine 0.5 milliGRAM(s) Oral two times a day  chlorhexidine 4% Liquid 1 Application(s) Topical <User Schedule>  doxycycline hyclate Capsule 100 milliGRAM(s) Oral every 12 hours  enalapril 20 milliGRAM(s) Oral daily  fluocinonide 0.05% Cream 1 Application(s) Topical every 12 hours  heparin  Injectable 5000 Unit(s) SubCutaneous every 8 hours  levETIRAcetam 500 milliGRAM(s) Oral two times a day  magnesium oxide 400 milliGRAM(s) Oral once  pantoprazole    Tablet 40 milliGRAM(s) Oral before breakfast  predniSONE   Tablet 60 milliGRAM(s) Oral daily    MEDICATIONS  (PRN):  diphenhydrAMINE 25 milliGRAM(s) Oral every 4 hours PRN Rash and/or Itching  hydrOXYzine hydrochloride 50 milliGRAM(s) Oral three times a day PRN Itching Pt seen and examined at bedside. No CP or SOB.    PAST MEDICAL & SURGICAL HISTORY:  Schizophrenia  Mental retardation, idiopathic mild  Hypertension  Seizure disorder  No significant past surgical history      VITAL SIGNS (Last 24 hrs):  T(C): 36.3 (11-19-18 @ 13:58), Max: 36.3 (11-19-18 @ 13:58)  HR: 80 (11-19-18 @ 13:58) (75 - 83)  BP: 135/61 (11-19-18 @ 13:58) (135/61 - 159/68)  RR: 18 (11-19-18 @ 13:58) (18 - 20)  SpO2: --  Wt(kg): --  Daily     Daily     I&O's Summary    19 Nov 2018 07:01  -  19 Nov 2018 16:27  --------------------------------------------------------  IN: 680 mL / OUT: 900 mL / NET: -220 mL        PHYSICAL EXAM:  GENERAL: NAD, well-developed  HEAD:  Atraumatic, Normocephalic  EYES: EOMI, PERRLA, conjunctiva and sclera clear  NECK: Supple, No JVD  CHEST/LUNG: Clear to auscultation bilaterally; No wheeze  HEART: Regular rate and rhythm; No murmurs, rubs, or gallops  ABDOMEN: Soft, Nontender, Nondistended; Bowel sounds present  EXTREMITIES:  2+ Peripheral Pulses, No clubbing, cyanosis, or edema  PSYCH: AAOx3  NEUROLOGY: non-focal  SKIN: multiple lesions, burst bulli, no oral leasions    Labs Reviewed  Spoke to patient in regards to abnormal labs.    CBC Full  -  ( 18 Nov 2018 06:43 )  WBC Count : 18.80 K/uL  Hemoglobin : 10.4 g/dL  Hematocrit : 32.0 %  Platelet Count - Automated : 177 K/uL  Mean Cell Volume : 88.2 fL  Mean Cell Hemoglobin : 28.7 pg  Mean Cell Hemoglobin Concentration : 32.5 g/dL  Auto Neutrophil # : x  Auto Lymphocyte # : x  Auto Monocyte # : x  Auto Eosinophil # : x  Auto Basophil # : x  Auto Neutrophil % : x  Auto Lymphocyte % : x  Auto Monocyte % : x  Auto Eosinophil % : x  Auto Basophil % : x    BMP:    11-17 @ 07:59    Blood Urea Nitrogen - 37  Calcium - 8.1  Carbond Dioxide - 23  Chloride - 105  Creatinine - 1.0  Glucose - 83  Potassium - 4.0  Sodium - 142      Hemoglobin A1c -     Urine Culture:        Imaging reviewed      MEDICATIONS  (STANDING):  benztropine 0.5 milliGRAM(s) Oral two times a day  chlorhexidine 4% Liquid 1 Application(s) Topical <User Schedule>  doxycycline hyclate Capsule 100 milliGRAM(s) Oral every 12 hours  enalapril 20 milliGRAM(s) Oral daily  fluocinonide 0.05% Cream 1 Application(s) Topical every 12 hours  heparin  Injectable 5000 Unit(s) SubCutaneous every 8 hours  levETIRAcetam 500 milliGRAM(s) Oral two times a day  magnesium oxide 400 milliGRAM(s) Oral once  pantoprazole    Tablet 40 milliGRAM(s) Oral before breakfast  predniSONE   Tablet 60 milliGRAM(s) Oral daily    MEDICATIONS  (PRN):  diphenhydrAMINE 25 milliGRAM(s) Oral every 4 hours PRN Rash and/or Itching  hydrOXYzine hydrochloride 50 milliGRAM(s) Oral three times a day PRN Itching

## 2018-11-19 NOTE — PROGRESS NOTE ADULT - SUBJECTIVE AND OBJECTIVE BOX
11/19/18  The ptient has aid at bedside, brock acute complaints this am. Group home contacted for evaluation prior to discharge. Preliminary discharge paperwork given to [facility] containing medications that he will be discharged with. to be sent to [facility] in [facility].   informed group home that all other test results and entire chart will be available from medical records after the discharge is finalized.   Patient had biopsy on 11/15/19, results still pending. called [phone] (pathology office) for this update. Patient can follow up with burn next tuesday in clinic for results of the biopsy.    SUBJECTIVE:    Patient is a 73y old Male who presents with a chief complaint of s/p mechanical fall (19 Nov 2018 16:23)    Currently admitted to medicine with the primary diagnosis of Dermatitis     Today is hospital day 6d. This morning he is resting comfortably in bed and reports no new issues or overnight events.     PAST MEDICAL & SURGICAL HISTORY  Schizophrenia  Mental retardation, idiopathic mild  Hypertension  Seizure disorder  No significant past surgical history    SOCIAL HISTORY:  Negative for smoking/alcohol/drug use.     ALLERGIES:  Allergy Status Unknown    MEDICATIONS:  STANDING MEDICATIONS  benztropine 0.5 milliGRAM(s) Oral two times a day  chlorhexidine 4% Liquid 1 Application(s) Topical <User Schedule>  doxycycline hyclate Capsule 100 milliGRAM(s) Oral every 12 hours  enalapril 20 milliGRAM(s) Oral daily  fluocinonide 0.05% Cream 1 Application(s) Topical every 12 hours  heparin  Injectable 5000 Unit(s) SubCutaneous every 8 hours  levETIRAcetam 500 milliGRAM(s) Oral two times a day  pantoprazole    Tablet 40 milliGRAM(s) Oral before breakfast  predniSONE   Tablet 60 milliGRAM(s) Oral daily    PRN MEDICATIONS  diphenhydrAMINE 25 milliGRAM(s) Oral every 4 hours PRN  hydrOXYzine hydrochloride 50 milliGRAM(s) Oral three times a day PRN    VITALS:   T(F): 96.9  HR: 72  BP: 157/70  RR: 18  SpO2: --    LABS:                        10.4   18.80 )-----------( 177      ( 18 Nov 2018 06:43 )             32.0     RADIOLOGY:    PHYSICAL EXAM:  GEN: NAD  LUNGS/Heart: could not evaluate as patient on contact and no clean yellow stethoscope available at the time.   ABD: Soft, NT/ND. (+) bs  EXT: no c/c/e. 2+PP, GONZALEZ. Patient has multiple lesions wors benjamin the arms than the legs and back. present on face but not orally. He has many burst bullae but few intact august at this time.   NEURO: No new focal deficits, at baseline (aid at bedside).

## 2018-11-19 NOTE — PROGRESS NOTE ADULT - ASSESSMENT
73 Yr M PMH Intellectual disability, seizure, HTN, MI, Thrombocytopenia, schizophrenia, ?? contact dermatitis presenting with mechanical fall    #mechanical fall   - neg trauma workup    #sepsis   - no fevers, hemodynamically stable, monitor WBC count-rise likely due to steroid use.   - Likely secondary to cellulitis, patient on doxycycline q12 to end 11/25/2018  - blood cultures negative to date     #Bullous Pemphigoid   - Biopsy done by burn on 11/15/2018, results pending   - Patient on prednisone taper (60 mg until 11/22/2018 then 40 mg until 11/29/2018) with improvement of lesions   - c/w atarax, benadryl prn and flucinonide cream as per derm   -patient to follow up with burn on tuesday the week after discharge, for bopsy results if available.     #Hypertension   - Enalapril 20 qd restarted    # hx of mental retardation , schizophrenia from group home  - on home meds  - follows up w psych    #seizures  - cont keppra  - No seizure activities while inpatient       GI PPX: Pantoprozole  DVT PPX: Heparin SQ  Disposition: to group home tomorrow. [facility] in [facility] for meds. Wound care per derm.

## 2018-11-20 LAB
HCT VFR BLD CALC: 31.9 % — LOW (ref 42–52)
HGB BLD-MCNC: 10.2 G/DL — LOW (ref 14–18)
MCHC RBC-ENTMCNC: 28.3 PG — SIGNIFICANT CHANGE UP (ref 27–31)
MCHC RBC-ENTMCNC: 32 G/DL — SIGNIFICANT CHANGE UP (ref 32–37)
MCV RBC AUTO: 88.4 FL — SIGNIFICANT CHANGE UP (ref 80–94)
NRBC # BLD: 0 /100 WBCS — SIGNIFICANT CHANGE UP (ref 0–0)
PLATELET # BLD AUTO: 154 K/UL — SIGNIFICANT CHANGE UP (ref 130–400)
RBC # BLD: 3.61 M/UL — LOW (ref 4.7–6.1)
RBC # FLD: 15.7 % — HIGH (ref 11.5–14.5)
WBC # BLD: 17.46 K/UL — HIGH (ref 4.8–10.8)
WBC # FLD AUTO: 17.46 K/UL — HIGH (ref 4.8–10.8)

## 2018-11-20 PROCEDURE — 93970 EXTREMITY STUDY: CPT | Mod: 26

## 2018-11-20 RX ORDER — FLUOCINONIDE/EMOLLIENT BASE 0.05 %
1 CREAM (GRAM) TOPICAL
Qty: 600 | Refills: 2 | OUTPATIENT
Start: 2018-11-20 | End: 2019-02-17

## 2018-11-20 RX ORDER — PANTOPRAZOLE SODIUM 20 MG/1
1 TABLET, DELAYED RELEASE ORAL
Qty: 30 | Refills: 0 | OUTPATIENT
Start: 2018-11-20 | End: 2018-12-19

## 2018-11-20 RX ADMIN — Medication 0.5 MILLIGRAM(S): at 05:40

## 2018-11-20 RX ADMIN — PANTOPRAZOLE SODIUM 40 MILLIGRAM(S): 20 TABLET, DELAYED RELEASE ORAL at 08:21

## 2018-11-20 RX ADMIN — LEVETIRACETAM 500 MILLIGRAM(S): 250 TABLET, FILM COATED ORAL at 05:40

## 2018-11-20 RX ADMIN — Medication 100 MILLIGRAM(S): at 17:15

## 2018-11-20 RX ADMIN — Medication 25 MILLIGRAM(S): at 05:41

## 2018-11-20 RX ADMIN — CHLORHEXIDINE GLUCONATE 1 APPLICATION(S): 213 SOLUTION TOPICAL at 05:41

## 2018-11-20 RX ADMIN — Medication 0.5 MILLIGRAM(S): at 17:15

## 2018-11-20 RX ADMIN — HEPARIN SODIUM 5000 UNIT(S): 5000 INJECTION INTRAVENOUS; SUBCUTANEOUS at 22:27

## 2018-11-20 RX ADMIN — Medication 20 MILLIGRAM(S): at 05:40

## 2018-11-20 RX ADMIN — Medication 60 MILLIGRAM(S): at 05:40

## 2018-11-20 RX ADMIN — Medication 1 APPLICATION(S): at 17:15

## 2018-11-20 RX ADMIN — HEPARIN SODIUM 5000 UNIT(S): 5000 INJECTION INTRAVENOUS; SUBCUTANEOUS at 05:40

## 2018-11-20 RX ADMIN — LEVETIRACETAM 500 MILLIGRAM(S): 250 TABLET, FILM COATED ORAL at 17:15

## 2018-11-20 RX ADMIN — Medication 1 APPLICATION(S): at 05:41

## 2018-11-20 RX ADMIN — HEPARIN SODIUM 5000 UNIT(S): 5000 INJECTION INTRAVENOUS; SUBCUTANEOUS at 13:00

## 2018-11-20 RX ADMIN — Medication 100 MILLIGRAM(S): at 05:40

## 2018-11-20 NOTE — DIETITIAN INITIAL EVALUATION ADULT. - ENERGY NEEDS
calorie 2022-2430kcal (25-30kcal/kg IBW) for borderline obesity  protein 81-97g (1-1.2g/kg IBW)  fluid 1ml/kcal or per LIP

## 2018-11-20 NOTE — CHART NOTE - NSCHARTNOTEFT_GEN_A_CORE
patient pending discharge to [facility] home, anticipated yesterday sandra awaiting final read on vascular duplex. notified vascular team twice so far. no read yet.

## 2018-11-20 NOTE — PROGRESS NOTE ADULT - SUBJECTIVE AND OBJECTIVE BOX
Pt seen and examined at bedside. No CP or SOB.    PAST MEDICAL & SURGICAL HISTORY:  Schizophrenia  Mental retardation, idiopathic mild  Hypertension  Seizure disorder  No significant past surgical history      VITAL SIGNS (Last 24 hrs):  T(C): 36.3 (11-20-18 @ 05:29), Max: 36.3 (11-19-18 @ 13:58)  HR: 69 (11-20-18 @ 05:29) (69 - 80)  BP: 132/60 (11-20-18 @ 05:29) (132/60 - 157/70)  RR: 18 (11-20-18 @ 05:29) (18 - 18)  SpO2: --  Wt(kg): --  Daily     Daily     I&O's Summary    19 Nov 2018 07:01  -  20 Nov 2018 07:00  --------------------------------------------------------  IN: 680 mL / OUT: 900 mL / NET: -220 mL        PHYSICAL EXAM:  GENERAL: NAD, well-developed  HEAD:  Atraumatic, Normocephalic  EYES: EOMI, PERRLA, conjunctiva and sclera clear  NECK: Supple, No JVD  CHEST/LUNG: Clear to auscultation bilaterally; No wheeze  HEART: Regular rate and rhythm; No murmurs, rubs, or gallops  ABDOMEN: Soft, Nontender, Nondistended; Bowel sounds present  EXTREMITIES:  2+ Peripheral Pulses, left lower ext swelling and edema  PSYCH: AAOx3  NEUROLOGY: non-focal  SKIN: No rashes or lesions    Labs Reviewed  Spoke to patient in regards to abnormal labs.    CBC Full  -  ( 20 Nov 2018 06:27 )  WBC Count : 17.46 K/uL  Hemoglobin : 10.2 g/dL  Hematocrit : 31.9 %  Platelet Count - Automated : 154 K/uL  Mean Cell Volume : 88.4 fL  Mean Cell Hemoglobin : 28.3 pg  Mean Cell Hemoglobin Concentration : 32.0 g/dL  Auto Neutrophil # : x  Auto Lymphocyte # : x  Auto Monocyte # : x  Auto Eosinophil # : x  Auto Basophil # : x  Auto Neutrophil % : x  Auto Lymphocyte % : x  Auto Monocyte % : x  Auto Eosinophil % : x  Auto Basophil % : x    BMP:    11-17 @ 07:59    Blood Urea Nitrogen - 37  Calcium - 8.1  Carbond Dioxide - 23  Chloride - 105  Creatinine - 1.0  Glucose - 83  Potassium - 4.0  Sodium - 142      Hemoglobin A1c -     Urine Culture:        Imaging reviewed      MEDICATIONS  (STANDING):  benztropine 0.5 milliGRAM(s) Oral two times a day  chlorhexidine 4% Liquid 1 Application(s) Topical <User Schedule>  doxycycline hyclate Capsule 100 milliGRAM(s) Oral every 12 hours  enalapril 20 milliGRAM(s) Oral daily  fluocinonide 0.05% Cream 1 Application(s) Topical every 12 hours  heparin  Injectable 5000 Unit(s) SubCutaneous every 8 hours  levETIRAcetam 500 milliGRAM(s) Oral two times a day  pantoprazole    Tablet 40 milliGRAM(s) Oral before breakfast  predniSONE   Tablet 60 milliGRAM(s) Oral daily    MEDICATIONS  (PRN):  diphenhydrAMINE 25 milliGRAM(s) Oral every 4 hours PRN Rash and/or Itching  hydrOXYzine hydrochloride 50 milliGRAM(s) Oral three times a day PRN Itching

## 2018-11-20 NOTE — DIETITIAN INITIAL EVALUATION ADULT. - PHYSICAL APPEARANCE
obese/BMI 37.9, alert, pt. with MR, skin: rash (BS 17) BMI 37.9, alert, pt. with MR, skin: rash (BS 17). Health aide unsure of UBW, but reports no recent weight changes/obese

## 2018-11-20 NOTE — PROGRESS NOTE ADULT - ASSESSMENT
74 yo M with PMHx of intellectual disability, seizure, HTN, MI, Thrombocytopenia, schizophrenia and dermatitis presented s/p mechanical fall. Thought to have possible sepsis 2/2 cellulitis. However per ID and derm, rash consistent with bullous pemphigoid. S/p biopsy by burn, results pending.     Assessment:   - Diffuse erythematous, macular rash w/ crusting lesions likely 2/2 bullous pemphigoid  - Leucocytosis w/ likely 2/2 steroids   - Right Great Toe Erythema (without pus, not infected)  -Left lower ext swelling   - HTN  - Hx of mental retardation, Schizophrenia   - Hx of seizures  - DVT ppx    Plan:  - sterioids per derm, fu bx   -Left lower ext duplex  - c/w fluocinonide cream  - c/w doxy 100mg q12h for 7 days  - derm recs appreciated  - ID recs appreciated   - pt remains afebrile however w/ increase wbc and hx of cough CXR was repeated, no evidence of infection   - increase Enalapril to home dose 20mg qd  - c/w keppra  - HSQ for DVT ppx    Dispo: pending LE , if neg for DVT may be Dced and follow up outpt, elevated legs at night 72 yo M with PMHx of intellectual disability, seizure, HTN, MI, Thrombocytopenia, schizophrenia and dermatitis presented s/p mechanical fall. Thought to have possible sepsis 2/2 cellulitis. However per ID and derm, rash consistent with bullous pemphigoid. S/p biopsy by burn, results pending.     Assessment:   - Diffuse erythematous, macular rash w/ crusting lesions likely 2/2 bullous pemphigoid  - Leucocytosis w/ likely 2/2 steroids   - Right Great Toe Erythema (without pus, not infected)  -Left lower ext swelling   - HTN  - Hx of mental retardation, Schizophrenia   - Hx of seizures  - DVT ppx    Plan:  - sterioids per derm, fu bx   -Left lower ext duplex  - c/w fluocinonide cream  - c/w doxy 100mg q12h for 7 days  - derm recs appreciated  - ID recs appreciated   - pt remains afebrile however w/ increase wbc and hx of cough CXR was repeated, no evidence of infection   - increase Enalapril to home dose 20mg qd  - c/w keppra  - HSQ for DVT ppx    Dispo: pending LE US, if neg for DVT may be Dced and follow up outpt, if positive for DVT pt will need AC with NOAC, elevate legs at night

## 2018-11-20 NOTE — DIETITIAN INITIAL EVALUATION ADULT. - OTHER INFO
Initial assessment for LOS. P/w: Initial assessment for LOS. P/w: s/p mech soft. Left lower ext swelling- steroids per derm. ID following. Dispo: pending LE US, if neg for DVT may be dced and follow up outpt,.

## 2018-11-21 VITALS
HEART RATE: 75 BPM | RESPIRATION RATE: 18 BRPM | TEMPERATURE: 96 F | DIASTOLIC BLOOD PRESSURE: 52 MMHG | SYSTOLIC BLOOD PRESSURE: 109 MMHG

## 2018-11-21 RX ORDER — FLUPHENAZINE HYDROCHLORIDE 1 MG/1
12.5 TABLET, FILM COATED ORAL ONCE
Qty: 0 | Refills: 0 | Status: COMPLETED | OUTPATIENT
Start: 2018-11-21 | End: 2018-11-21

## 2018-11-21 RX ORDER — FLUOCINONIDE/EMOLLIENT BASE 0.05 %
1 CREAM (GRAM) TOPICAL
Qty: 0 | Refills: 0 | Status: DISCONTINUED | OUTPATIENT
Start: 2018-11-21 | End: 2018-11-21

## 2018-11-21 RX ADMIN — HEPARIN SODIUM 5000 UNIT(S): 5000 INJECTION INTRAVENOUS; SUBCUTANEOUS at 05:21

## 2018-11-21 RX ADMIN — HEPARIN SODIUM 5000 UNIT(S): 5000 INJECTION INTRAVENOUS; SUBCUTANEOUS at 14:49

## 2018-11-21 RX ADMIN — Medication 100 MILLIGRAM(S): at 05:20

## 2018-11-21 RX ADMIN — Medication 0.5 MILLIGRAM(S): at 05:21

## 2018-11-21 RX ADMIN — Medication 20 MILLIGRAM(S): at 05:21

## 2018-11-21 RX ADMIN — LEVETIRACETAM 500 MILLIGRAM(S): 250 TABLET, FILM COATED ORAL at 05:22

## 2018-11-21 RX ADMIN — Medication 1 APPLICATION(S): at 06:28

## 2018-11-21 RX ADMIN — FLUPHENAZINE HYDROCHLORIDE 12.5 MILLIGRAM(S): 1 TABLET, FILM COATED ORAL at 15:07

## 2018-11-21 RX ADMIN — PANTOPRAZOLE SODIUM 40 MILLIGRAM(S): 20 TABLET, DELAYED RELEASE ORAL at 06:28

## 2018-11-21 RX ADMIN — Medication 60 MILLIGRAM(S): at 05:21

## 2018-11-21 RX ADMIN — Medication 25 MILLIGRAM(S): at 05:19

## 2018-11-21 RX ADMIN — Medication 50 MILLIGRAM(S): at 14:49

## 2018-11-21 NOTE — PROGRESS NOTE ADULT - SUBJECTIVE AND OBJECTIVE BOX
Pt seen and examined at bedside. No CP or SOB. Edema in left lower ext improved.     PAST MEDICAL & SURGICAL HISTORY:  Schizophrenia  Mental retardation, idiopathic mild  Hypertension  Seizure disorder  No significant past surgical history      VITAL SIGNS (Last 24 hrs):  T(C): 35.7 (11-21-18 @ 05:10), Max: 35.9 (11-20-18 @ 22:20)  HR: 71 (11-21-18 @ 05:10) (69 - 73)  BP: 160/72 (11-21-18 @ 05:10) (117/56 - 160/72)  RR: 18 (11-21-18 @ 05:10) (18 - 20)  SpO2: --  Wt(kg): --  Daily     Daily     I&O's Summary    20 Nov 2018 07:01  -  21 Nov 2018 07:00  --------------------------------------------------------  IN: 360 mL / OUT: 0 mL / NET: 360 mL        PHYSICAL EXAM:  GENERAL: NAD, well-developed  HEAD:  Atraumatic, Normocephalic  EYES: EOMI, PERRLA, conjunctiva and sclera clear  NECK: Supple, No JVD  CHEST/LUNG: Clear to auscultation bilaterally; No wheeze  HEART: Regular rate and rhythm; No murmurs, rubs, or gallops  ABDOMEN: Soft, Nontender, Nondistended; Bowel sounds present  EXTREMITIES:  2+ Peripheral Pulses, No clubbing, cyanosis, left lower ext edema +1  PSYCH: AAOx3  NEUROLOGY: non-focal  SKIN: No rashes or lesions    Labs Reviewed  Spoke to patient in regards to abnormal labs.    CBC Full  -  ( 20 Nov 2018 06:27 )  WBC Count : 17.46 K/uL  Hemoglobin : 10.2 g/dL  Hematocrit : 31.9 %  Platelet Count - Automated : 154 K/uL  Mean Cell Volume : 88.4 fL  Mean Cell Hemoglobin : 28.3 pg  Mean Cell Hemoglobin Concentration : 32.0 g/dL  Auto Neutrophil # : x  Auto Lymphocyte # : x  Auto Monocyte # : x  Auto Eosinophil # : x  Auto Basophil # : x  Auto Neutrophil % : x  Auto Lymphocyte % : x  Auto Monocyte % : x  Auto Eosinophil % : x  Auto Basophil % : x    BMP:      Hemoglobin A1c -     Urine Culture:    Imaging reviewed     MEDICATIONS  (STANDING):  benztropine 0.5 milliGRAM(s) Oral two times a day  chlorhexidine 4% Liquid 1 Application(s) Topical <User Schedule>  enalapril 20 milliGRAM(s) Oral daily  fluocinonide 0.05% Ointment 1 Application(s) Topical two times a day  heparin  Injectable 5000 Unit(s) SubCutaneous every 8 hours  levETIRAcetam 500 milliGRAM(s) Oral two times a day  pantoprazole    Tablet 40 milliGRAM(s) Oral before breakfast  predniSONE   Tablet 60 milliGRAM(s) Oral daily    MEDICATIONS  (PRN):  diphenhydrAMINE 25 milliGRAM(s) Oral every 4 hours PRN Rash and/or Itching  hydrOXYzine hydrochloride 50 milliGRAM(s) Oral three times a day PRN Itching

## 2018-11-21 NOTE — PROGRESS NOTE ADULT - REASON FOR ADMISSION
s/p mechanical fall

## 2018-11-21 NOTE — PROGRESS NOTE ADULT - ASSESSMENT
74 yo M with PMHx of intellectual disability, seizure, HTN, MI, Thrombocytopenia, schizophrenia and dermatitis presented s/p mechanical fall. Thought to have possible sepsis 2/2 cellulitis. However per ID and derm, rash consistent with bullous pemphigoid. S/p biopsy by burn, results pending.     Assessment:   - Diffuse erythematous, macular rash w/ crusting lesions likely 2/2 bullous pemphigoid  - Leucocytosis w/ likely 2/2 steroids   - Right Great Toe Erythema (without pus, not infected)  -Left lower ext swelling   - HTN  - Hx of mental retardation, Schizophrenia   - Hx of seizures  - DVT ppx    Plan:  - sterioids per derm, fu bx   -Left lower ext duplex- negative for DVT BL  - c/w fluocinonide cream  - c/w doxy 100mg q12h for 7 days  - derm recs appreciated  - ID recs appreciated   - pt remains afebrile however w/ increase wbc and hx of cough CXR was repeated, no evidence of infection   - increase Enalapril to home dose 20mg qd  - c/w keppra  - HSQ for DVT ppx    Dispo: pt medically cleared for DC.

## 2018-12-13 ENCOUNTER — OUTPATIENT (OUTPATIENT)
Dept: OUTPATIENT SERVICES | Facility: HOSPITAL | Age: 73
LOS: 1 days | Discharge: HOME | End: 2018-12-13

## 2018-12-13 ENCOUNTER — APPOINTMENT (OUTPATIENT)
Dept: BURN CARE | Facility: CLINIC | Age: 73
End: 2018-12-13

## 2018-12-13 DIAGNOSIS — S71.101A UNSPECIFIED OPEN WOUND, RIGHT THIGH, INITIAL ENCOUNTER: ICD-10-CM

## 2018-12-13 DIAGNOSIS — S61.401A UNSPECIFIED OPEN WOUND OF RIGHT HAND, INITIAL ENCOUNTER: ICD-10-CM
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
## 2018-12-13 DIAGNOSIS — R23.8 OTHER SKIN CHANGES: ICD-10-CM
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
## 2018-12-19 PROBLEM — R23.8 MULTIPLE WOUNDS OF SKIN: Status: ACTIVE | Noted: 2018-12-19

## 2019-01-03 ENCOUNTER — OUTPATIENT (OUTPATIENT)
Dept: OUTPATIENT SERVICES | Facility: HOSPITAL | Age: 74
LOS: 1 days | Discharge: HOME | End: 2019-01-03

## 2019-01-03 ENCOUNTER — APPOINTMENT (OUTPATIENT)
Dept: BURN CARE | Facility: CLINIC | Age: 74
End: 2019-01-03

## 2019-01-03 DIAGNOSIS — S41.101A UNSPECIFIED OPEN WOUND OF RIGHT UPPER ARM, INITIAL ENCOUNTER: ICD-10-CM

## 2019-01-03 DIAGNOSIS — S71.101A UNSPECIFIED OPEN WOUND, RIGHT THIGH, INITIAL ENCOUNTER: ICD-10-CM
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
## 2021-10-06 PROBLEM — I10 ESSENTIAL HYPERTENSION: Status: ACTIVE | Noted: 2017-01-19
